# Patient Record
Sex: FEMALE | URBAN - METROPOLITAN AREA
[De-identification: names, ages, dates, MRNs, and addresses within clinical notes are randomized per-mention and may not be internally consistent; named-entity substitution may affect disease eponyms.]

---

## 2019-12-06 ENCOUNTER — EMERGENCY (EMERGENCY)
Facility: HOSPITAL | Age: 34
LOS: 1 days | Discharge: HOME | End: 2019-12-06
Attending: STUDENT IN AN ORGANIZED HEALTH CARE EDUCATION/TRAINING PROGRAM | Admitting: STUDENT IN AN ORGANIZED HEALTH CARE EDUCATION/TRAINING PROGRAM
Payer: MEDICAID

## 2019-12-06 VITALS
RESPIRATION RATE: 18 BRPM | TEMPERATURE: 98 F | OXYGEN SATURATION: 100 % | HEART RATE: 85 BPM | DIASTOLIC BLOOD PRESSURE: 71 MMHG | SYSTOLIC BLOOD PRESSURE: 115 MMHG

## 2019-12-06 LAB
ALBUMIN SERPL ELPH-MCNC: 4.2 G/DL — SIGNIFICANT CHANGE UP (ref 3.3–5)
ALP SERPL-CCNC: 62 U/L — SIGNIFICANT CHANGE UP (ref 40–120)
ALT FLD-CCNC: 35 U/L — HIGH (ref 4–33)
ANION GAP SERPL CALC-SCNC: 11 MMO/L — SIGNIFICANT CHANGE UP (ref 7–14)
APPEARANCE UR: CLEAR — SIGNIFICANT CHANGE UP
AST SERPL-CCNC: 41 U/L — HIGH (ref 4–32)
BACTERIA # UR AUTO: NEGATIVE — SIGNIFICANT CHANGE UP
BASE EXCESS BLDV CALC-SCNC: -0.5 MMOL/L — SIGNIFICANT CHANGE UP
BASOPHILS # BLD AUTO: 0.04 K/UL — SIGNIFICANT CHANGE UP (ref 0–0.2)
BASOPHILS NFR BLD AUTO: 0.7 % — SIGNIFICANT CHANGE UP (ref 0–2)
BILIRUB SERPL-MCNC: 0.3 MG/DL — SIGNIFICANT CHANGE UP (ref 0.2–1.2)
BILIRUB UR-MCNC: NEGATIVE — SIGNIFICANT CHANGE UP
BLOOD GAS VENOUS - CREATININE: 0.47 MG/DL — LOW (ref 0.5–1.3)
BLOOD UR QL VISUAL: NEGATIVE — SIGNIFICANT CHANGE UP
BUN SERPL-MCNC: 14 MG/DL — SIGNIFICANT CHANGE UP (ref 7–23)
CALCIUM SERPL-MCNC: 9.3 MG/DL — SIGNIFICANT CHANGE UP (ref 8.4–10.5)
CHLORIDE BLDV-SCNC: 111 MMOL/L — HIGH (ref 96–108)
CHLORIDE SERPL-SCNC: 107 MMOL/L — SIGNIFICANT CHANGE UP (ref 98–107)
CO2 SERPL-SCNC: 21 MMOL/L — LOW (ref 22–31)
COLOR SPEC: YELLOW — SIGNIFICANT CHANGE UP
CREAT SERPL-MCNC: 0.43 MG/DL — LOW (ref 0.5–1.3)
EOSINOPHIL # BLD AUTO: 0.18 K/UL — SIGNIFICANT CHANGE UP (ref 0–0.5)
EOSINOPHIL NFR BLD AUTO: 3.2 % — SIGNIFICANT CHANGE UP (ref 0–6)
GAS PNL BLDV: 139 MMOL/L — SIGNIFICANT CHANGE UP (ref 136–146)
GLUCOSE BLDV-MCNC: 106 MG/DL — HIGH (ref 70–99)
GLUCOSE SERPL-MCNC: 107 MG/DL — HIGH (ref 70–99)
GLUCOSE UR-MCNC: NEGATIVE — SIGNIFICANT CHANGE UP
HCO3 BLDV-SCNC: 23 MMOL/L — SIGNIFICANT CHANGE UP (ref 20–27)
HCT VFR BLD CALC: 35.8 % — SIGNIFICANT CHANGE UP (ref 34.5–45)
HCT VFR BLDV CALC: 36 % — SIGNIFICANT CHANGE UP (ref 34.5–45)
HGB BLD-MCNC: 10.8 G/DL — LOW (ref 11.5–15.5)
HGB BLDV-MCNC: 11.7 G/DL — SIGNIFICANT CHANGE UP (ref 11.5–15.5)
HYALINE CASTS # UR AUTO: NEGATIVE — SIGNIFICANT CHANGE UP
IMM GRANULOCYTES NFR BLD AUTO: 1.2 % — SIGNIFICANT CHANGE UP (ref 0–1.5)
KETONES UR-MCNC: NEGATIVE — SIGNIFICANT CHANGE UP
LACTATE BLDV-MCNC: 1.3 MMOL/L — SIGNIFICANT CHANGE UP (ref 0.5–2)
LEUKOCYTE ESTERASE UR-ACNC: NEGATIVE — SIGNIFICANT CHANGE UP
LIDOCAIN IGE QN: 30 U/L — SIGNIFICANT CHANGE UP (ref 7–60)
LYMPHOCYTES # BLD AUTO: 1.45 K/UL — SIGNIFICANT CHANGE UP (ref 1–3.3)
LYMPHOCYTES # BLD AUTO: 25.6 % — SIGNIFICANT CHANGE UP (ref 13–44)
MAGNESIUM SERPL-MCNC: 2.1 MG/DL — SIGNIFICANT CHANGE UP (ref 1.6–2.6)
MCHC RBC-ENTMCNC: 27.3 PG — SIGNIFICANT CHANGE UP (ref 27–34)
MCHC RBC-ENTMCNC: 30.2 % — LOW (ref 32–36)
MCV RBC AUTO: 90.6 FL — SIGNIFICANT CHANGE UP (ref 80–100)
MONOCYTES # BLD AUTO: 0.44 K/UL — SIGNIFICANT CHANGE UP (ref 0–0.9)
MONOCYTES NFR BLD AUTO: 7.8 % — SIGNIFICANT CHANGE UP (ref 2–14)
NEUTROPHILS # BLD AUTO: 3.49 K/UL — SIGNIFICANT CHANGE UP (ref 1.8–7.4)
NEUTROPHILS NFR BLD AUTO: 61.5 % — SIGNIFICANT CHANGE UP (ref 43–77)
NITRITE UR-MCNC: NEGATIVE — SIGNIFICANT CHANGE UP
NRBC # FLD: 0.02 K/UL — SIGNIFICANT CHANGE UP (ref 0–0)
PCO2 BLDV: 49 MMHG — SIGNIFICANT CHANGE UP (ref 41–51)
PH BLDV: 7.33 PH — SIGNIFICANT CHANGE UP (ref 7.32–7.43)
PH UR: 6 — SIGNIFICANT CHANGE UP (ref 5–8)
PHOSPHATE SERPL-MCNC: 3.4 MG/DL — SIGNIFICANT CHANGE UP (ref 2.5–4.5)
PLATELET # BLD AUTO: 300 K/UL — SIGNIFICANT CHANGE UP (ref 150–400)
PMV BLD: 11.3 FL — SIGNIFICANT CHANGE UP (ref 7–13)
PO2 BLDV: 55 MMHG — HIGH (ref 35–40)
POTASSIUM BLDV-SCNC: 3.7 MMOL/L — SIGNIFICANT CHANGE UP (ref 3.4–4.5)
POTASSIUM SERPL-MCNC: 4.4 MMOL/L — SIGNIFICANT CHANGE UP (ref 3.5–5.3)
POTASSIUM SERPL-SCNC: 4.4 MMOL/L — SIGNIFICANT CHANGE UP (ref 3.5–5.3)
PROT SERPL-MCNC: 7.7 G/DL — SIGNIFICANT CHANGE UP (ref 6–8.3)
PROT UR-MCNC: 30 — SIGNIFICANT CHANGE UP
RBC # BLD: 3.95 M/UL — SIGNIFICANT CHANGE UP (ref 3.8–5.2)
RBC # FLD: 13.2 % — SIGNIFICANT CHANGE UP (ref 10.3–14.5)
RBC CASTS # UR COMP ASSIST: SIGNIFICANT CHANGE UP (ref 0–?)
SAO2 % BLDV: 86.4 % — HIGH (ref 60–85)
SODIUM SERPL-SCNC: 139 MMOL/L — SIGNIFICANT CHANGE UP (ref 135–145)
SP GR SPEC: 1.04 — SIGNIFICANT CHANGE UP (ref 1–1.04)
SQUAMOUS # UR AUTO: SIGNIFICANT CHANGE UP
UROBILINOGEN FLD QL: NORMAL — SIGNIFICANT CHANGE UP
WBC # BLD: 5.67 K/UL — SIGNIFICANT CHANGE UP (ref 3.8–10.5)
WBC # FLD AUTO: 5.67 K/UL — SIGNIFICANT CHANGE UP (ref 3.8–10.5)
WBC UR QL: SIGNIFICANT CHANGE UP (ref 0–?)

## 2019-12-06 PROCEDURE — 76705 ECHO EXAM OF ABDOMEN: CPT | Mod: 26

## 2019-12-06 PROCEDURE — 99284 EMERGENCY DEPT VISIT MOD MDM: CPT

## 2019-12-06 RX ORDER — SODIUM CHLORIDE 9 MG/ML
1000 INJECTION INTRAMUSCULAR; INTRAVENOUS; SUBCUTANEOUS ONCE
Refills: 0 | Status: COMPLETED | OUTPATIENT
Start: 2019-12-06 | End: 2019-12-06

## 2019-12-06 RX ORDER — FAMOTIDINE 10 MG/ML
20 INJECTION INTRAVENOUS ONCE
Refills: 0 | Status: COMPLETED | OUTPATIENT
Start: 2019-12-06 | End: 2019-12-06

## 2019-12-06 RX ADMIN — FAMOTIDINE 20 MILLIGRAM(S): 10 INJECTION INTRAVENOUS at 16:51

## 2019-12-06 RX ADMIN — Medication 30 MILLILITER(S): at 16:51

## 2019-12-06 RX ADMIN — SODIUM CHLORIDE 1000 MILLILITER(S): 9 INJECTION INTRAMUSCULAR; INTRAVENOUS; SUBCUTANEOUS at 16:51

## 2019-12-06 NOTE — ED ADULT TRIAGE NOTE - CHIEF COMPLAINT QUOTE
PT C/O headache, Abdominal pain, nausea x 2 weeks. PHX: Gallstones. Denies urinary symptoms, fevers, vomiting, CP, diarrhea, daily medications.

## 2019-12-06 NOTE — ED PROVIDER NOTE - PHYSICAL EXAMINATION
Lisa Short M.D.:   patient awake alert NAD .   LUNGS CTAB no wheeze no crackle.   CARD RRR no m/r/g.    Abdomen soft minimal epigastric and ruq tenderness ND no rebound no guarding no CVA tenderness.   EXT WWP no edema no calf tenderness CV 2+DP/PT bilaterally.   neuro A&Ox3 gait normal.    skin warm and dry no rash  HEENT: moist mucous membranes, PERRL, EOMI

## 2019-12-06 NOTE — ED PROVIDER NOTE - CLINICAL SUMMARY MEDICAL DECISION MAKING FREE TEXT BOX
34F hx gallstones p/w 1 month upper abd pain, nontoxic appearing on exam with minimal RUQ/epigastric pain. suspect gastritis given chronicity of symptoms, less likely biliary colic vs cholecystitis. plan for labs, gi cocktail, ruq us and reassess.

## 2019-12-06 NOTE — ED PROVIDER NOTE - PATIENT PORTAL LINK FT
You can access the FollowMyHealth Patient Portal offered by [facility] by registering at the following website: [email or web address]. By joining [employer]’s FollowMyHealth portal, you will also be able to view your health information using other applications (apps) compatible with our system.

## 2019-12-06 NOTE — ED PROVIDER NOTE - OBJECTIVE STATEMENT
Lisa Short M.D: 34F hx gallstones p/w 1 month of abd pain, diffuse, worse in upper abdomen assoc w/ nausea but no vomiting no f/c no diarrhea. has been taking tylenol without relief. notes pain ahs been worsening over the last week prompting ed visit. also with unrelated msk back pain and some mild frontal ehadache and lightheadedness.

## 2019-12-06 NOTE — ED PROVIDER NOTE - FAMILY HISTORY
How Severe Is Your Rash?: moderate
Is This A New Presentation, Or A Follow-Up?: Rash
No pertinent family history in first degree relatives

## 2019-12-08 LAB
BACTERIA UR CULT: SIGNIFICANT CHANGE UP
SPECIMEN SOURCE: SIGNIFICANT CHANGE UP

## 2020-03-14 ENCOUNTER — EMERGENCY (EMERGENCY)
Facility: HOSPITAL | Age: 35
LOS: 1 days | Discharge: HOME | End: 2020-03-14
Admitting: EMERGENCY MEDICINE
Payer: MEDICAID

## 2020-03-14 VITALS
DIASTOLIC BLOOD PRESSURE: 66 MMHG | SYSTOLIC BLOOD PRESSURE: 105 MMHG | RESPIRATION RATE: 14 BRPM | HEART RATE: 65 BPM | TEMPERATURE: 98 F | OXYGEN SATURATION: 100 %

## 2020-03-14 VITALS
SYSTOLIC BLOOD PRESSURE: 111 MMHG | OXYGEN SATURATION: 99 % | RESPIRATION RATE: 16 BRPM | TEMPERATURE: 99 F | DIASTOLIC BLOOD PRESSURE: 60 MMHG | HEART RATE: 62 BPM

## 2020-03-14 DIAGNOSIS — O03.9 COMPLETE OR UNSPECIFIED SPONTANEOUS ABORTION WITHOUT COMPLICATION: ICD-10-CM

## 2020-03-14 LAB
ALBUMIN SERPL ELPH-MCNC: 4.2 G/DL — SIGNIFICANT CHANGE UP (ref 3.3–5)
ALP SERPL-CCNC: 49 U/L — SIGNIFICANT CHANGE UP (ref 40–120)
ALT FLD-CCNC: 12 U/L — SIGNIFICANT CHANGE UP (ref 4–33)
ANION GAP SERPL CALC-SCNC: 14 MMO/L — SIGNIFICANT CHANGE UP (ref 7–14)
APPEARANCE UR: SIGNIFICANT CHANGE UP
AST SERPL-CCNC: 18 U/L — SIGNIFICANT CHANGE UP (ref 4–32)
BACTERIA # UR AUTO: NEGATIVE — SIGNIFICANT CHANGE UP
BASOPHILS # BLD AUTO: 0.03 K/UL — SIGNIFICANT CHANGE UP (ref 0–0.2)
BASOPHILS NFR BLD AUTO: 0.4 % — SIGNIFICANT CHANGE UP (ref 0–2)
BILIRUB SERPL-MCNC: 0.4 MG/DL — SIGNIFICANT CHANGE UP (ref 0.2–1.2)
BILIRUB UR-MCNC: NEGATIVE — SIGNIFICANT CHANGE UP
BLD GP AB SCN SERPL QL: NEGATIVE — SIGNIFICANT CHANGE UP
BLOOD UR QL VISUAL: HIGH
BUN SERPL-MCNC: 5 MG/DL — LOW (ref 7–23)
CALCIUM SERPL-MCNC: 9.6 MG/DL — SIGNIFICANT CHANGE UP (ref 8.4–10.5)
CHLORIDE SERPL-SCNC: 106 MMOL/L — SIGNIFICANT CHANGE UP (ref 98–107)
CO2 SERPL-SCNC: 19 MMOL/L — LOW (ref 22–31)
COLOR SPEC: SIGNIFICANT CHANGE UP
CREAT SERPL-MCNC: 0.44 MG/DL — LOW (ref 0.5–1.3)
EOSINOPHIL # BLD AUTO: 0.1 K/UL — SIGNIFICANT CHANGE UP (ref 0–0.5)
EOSINOPHIL NFR BLD AUTO: 1.4 % — SIGNIFICANT CHANGE UP (ref 0–6)
GLUCOSE SERPL-MCNC: 90 MG/DL — SIGNIFICANT CHANGE UP (ref 70–99)
GLUCOSE UR-MCNC: NEGATIVE — SIGNIFICANT CHANGE UP
HCG SERPL-ACNC: 1316 MIU/ML — SIGNIFICANT CHANGE UP
HCT VFR BLD CALC: 37.6 % — SIGNIFICANT CHANGE UP (ref 34.5–45)
HGB BLD-MCNC: 11.3 G/DL — LOW (ref 11.5–15.5)
HYALINE CASTS # UR AUTO: NEGATIVE — SIGNIFICANT CHANGE UP
IMM GRANULOCYTES NFR BLD AUTO: 0.7 % — SIGNIFICANT CHANGE UP (ref 0–1.5)
KETONES UR-MCNC: NEGATIVE — SIGNIFICANT CHANGE UP
LEUKOCYTE ESTERASE UR-ACNC: SIGNIFICANT CHANGE UP
LYMPHOCYTES # BLD AUTO: 1.89 K/UL — SIGNIFICANT CHANGE UP (ref 1–3.3)
LYMPHOCYTES # BLD AUTO: 27.4 % — SIGNIFICANT CHANGE UP (ref 13–44)
MCHC RBC-ENTMCNC: 26 PG — LOW (ref 27–34)
MCHC RBC-ENTMCNC: 30.1 % — LOW (ref 32–36)
MCV RBC AUTO: 86.4 FL — SIGNIFICANT CHANGE UP (ref 80–100)
MONOCYTES # BLD AUTO: 0.41 K/UL — SIGNIFICANT CHANGE UP (ref 0–0.9)
MONOCYTES NFR BLD AUTO: 5.9 % — SIGNIFICANT CHANGE UP (ref 2–14)
NEUTROPHILS # BLD AUTO: 4.43 K/UL — SIGNIFICANT CHANGE UP (ref 1.8–7.4)
NEUTROPHILS NFR BLD AUTO: 64.2 % — SIGNIFICANT CHANGE UP (ref 43–77)
NITRITE UR-MCNC: NEGATIVE — SIGNIFICANT CHANGE UP
NRBC # FLD: 0.02 K/UL — SIGNIFICANT CHANGE UP (ref 0–0)
PH UR: 6 — SIGNIFICANT CHANGE UP (ref 5–8)
PLATELET # BLD AUTO: 376 K/UL — SIGNIFICANT CHANGE UP (ref 150–400)
PMV BLD: 10.5 FL — SIGNIFICANT CHANGE UP (ref 7–13)
POTASSIUM SERPL-MCNC: 4 MMOL/L — SIGNIFICANT CHANGE UP (ref 3.5–5.3)
POTASSIUM SERPL-SCNC: 4 MMOL/L — SIGNIFICANT CHANGE UP (ref 3.5–5.3)
PROT SERPL-MCNC: 7.7 G/DL — SIGNIFICANT CHANGE UP (ref 6–8.3)
PROT UR-MCNC: 30 — SIGNIFICANT CHANGE UP
RBC # BLD: 4.35 M/UL — SIGNIFICANT CHANGE UP (ref 3.8–5.2)
RBC # FLD: 13.4 % — SIGNIFICANT CHANGE UP (ref 10.3–14.5)
RBC CASTS # UR COMP ASSIST: >50 — HIGH (ref 0–?)
RH IG SCN BLD-IMP: POSITIVE — SIGNIFICANT CHANGE UP
SODIUM SERPL-SCNC: 139 MMOL/L — SIGNIFICANT CHANGE UP (ref 135–145)
SP GR SPEC: 1.01 — SIGNIFICANT CHANGE UP (ref 1–1.04)
SQUAMOUS # UR AUTO: SIGNIFICANT CHANGE UP
UROBILINOGEN FLD QL: NORMAL — SIGNIFICANT CHANGE UP
WBC # BLD: 6.91 K/UL — SIGNIFICANT CHANGE UP (ref 3.8–10.5)
WBC # FLD AUTO: 6.91 K/UL — SIGNIFICANT CHANGE UP (ref 3.8–10.5)
WBC UR QL: SIGNIFICANT CHANGE UP (ref 0–?)

## 2020-03-14 PROCEDURE — 99284 EMERGENCY DEPT VISIT MOD MDM: CPT

## 2020-03-14 PROCEDURE — 76830 TRANSVAGINAL US NON-OB: CPT | Mod: 26

## 2020-03-14 RX ORDER — ACETAMINOPHEN 500 MG
975 TABLET ORAL ONCE
Refills: 0 | Status: COMPLETED | OUTPATIENT
Start: 2020-03-14 | End: 2020-03-14

## 2020-03-14 RX ADMIN — Medication 975 MILLIGRAM(S): at 19:27

## 2020-03-14 NOTE — ED PROVIDER NOTE - CLINICAL SUMMARY MEDICAL DECISION MAKING FREE TEXT BOX
33 yo female c/o vaginal bleeding and cramping lower abdominal pain x 2 days  -possible threatened ab  -labs, tvus  -possible obgyn consult

## 2020-03-14 NOTE — CONSULT NOTE ADULT - ATTENDING COMMENTS
35 y/o  LMP 18 at 8w0d by LMP c/o vaginal bleeding. No active bleeding on exam, Blood Type O+ BhCG 1316. TVUS done, no IUP seen, pregnancy of unknown location, likely complete , to f/u in 48 hours.

## 2020-03-14 NOTE — ED ADULT NURSE REASSESSMENT NOTE - NS ED NURSE REASSESS COMMENT FT1
Pt received from ultra sound alert and oriented x 4 pt is currently complaining of pelvic pain with vaginal bleeding 10/10 pain PA King was made aware v/s taken pt is afebrile.  Patient was medicated as ordered for pain.

## 2020-03-14 NOTE — CONSULT NOTE ADULT - ASSESSMENT
33 y/o  LMP  at 8w0d by LMP presents to the ED with vaginal bleeding, decreased since passing a large clot after sono. No active bleeding on exam, small clot in vault. Vital sign stable, H/H 11.3/37.6 T&S O+ bhcg 1316. TVUS shows endometrium of 1.4cm, no IUP seen, adnexa WNL. Pt likely with complete spontaneous .

## 2020-03-14 NOTE — CONSULT NOTE ADULT - SUBJECTIVE AND OBJECTIVE BOX
RENETTA ACE  34y  Female [id number]    HPI: 35 y/o  LMP  at 8w0d by LMP presents to the ED with vaginal bleeding. Pt reports positive home pregnancy test in early March, has not seen a GYN for this pregnancy. Pt reports she began experiencing spotting around midnight last night, bleeding progressively worsened. Pt reported to ED when she was changing pad Q2H with clot passage. Pt reports she passed a large clot after ultrasound, bleeding has slowed since. Pt reports mild abdominal cramping, relieved with Tylenol. Denies lightheadedness/dizziness. Denies f/c/n/v.         Name of GYN Physician: [facility]    POB:  , pLTCS 2015 NRFHT, SAB x1    Pgyn: Denies fibroids, cysts, endometriosis, STI's, Abnormal pap smears     Home meds: none    Hospital Meds:   MEDICATIONS  (STANDING):    MEDICATIONS  (PRN):      Allergies    proton pump inhibitors (Rash; Urticaria; Pruritus; Hives)  sulfa topicals (Rash; Urticaria; Pruritus; Hives)    Intolerances        PAST MEDICAL & SURGICAL HISTORY:  No pertinent past medical history  No significant past surgical history      FAMILY HISTORY:  No pertinent family history in first degree relatives      Social History:  Denies smoking use, drug use, alcohol use.     Vital Signs Last 24 Hrs  T(C): 36.8 (14 Mar 2020 19:19), Max: 37 (14 Mar 2020 15:16)  T(F): 98.3 (14 Mar 2020 19:19), Max: 98.6 (14 Mar 2020 15:16)  HR: 65 (14 Mar 2020 19:19) (62 - 65)  BP: 105/66 (14 Mar 2020 19:19) (105/66 - 111/60)  BP(mean): --  RR: 14 (14 Mar 2020 19:19) (14 - 16)  SpO2: 100% (14 Mar 2020 19:19) (99% - 100%)    Physical Exam:   General: sitting comfortably in bed, NAD   HEENT: neck supple, full ROM  CV: RR S1S2 no m/r/g  Lungs: CTA b/l, good air flow b/l   Back: No CVA tenderness  Abd: Soft, non-tender, non-distended.  Bowel sounds present.    :   Blood on 1/2 pad, changed 1 hour ago.    External labia wnl.  Bimanual exam with no cervical motion tenderness, uterus wnl, adnexa non palpable b/l. Cervix dilated 0.5 cm   Speculum Exam: No active bleeding from os, small clot evacuated from vault  Ext: non-tender b/l, no edema     LABS:                              11.3   6.91  )-----------( 376      ( 14 Mar 2020 18:30 )             37.6     03-14    139  |  106  |  5<L>  ----------------------------<  90  4.0   |  19<L>  |  0.44<L>    Ca    9.6      14 Mar 2020 18:30    TPro  7.7  /  Alb  4.2  /  TBili  0.4  /  DBili  x   /  AST  18  /  ALT  12  /  AlkPhos  49  03-14    HCG Quantitative, Serum: 1316: For pregnancy evaluation the reference values are as  follows:    Negative: <5 mIU/mL  Indeterminate: 5-25 mIU/mL (suggest repeat testing in 72hrs)  Positive: >25 mIU/mL    Note: hCG results of false positive and false negative for  pregnancy are rare, but can occur with this, and other hCG  tests. Annette- and post-menopausal females may have mildly  elevated hCG concentrations, usually less than 14 mIU/mL,  that are constant over time.    Weeks of pregnancy:           mIU/mL  ------------------        -------  3                     6 -      71  4                    10 -     750  5                   220 -   7,100  6                   160 -  32,000  7                 3,700 - 164,000  8                32,000 - 150,000  9                64,000- 151,000  10                47,000 - 187,000  12                28,000 - 211,000  14                14,000 -  63,000  15                12,000 -  71,000  16 - 18            8,000 -  58,000 mIU/mL (20 @ 18:30)        I&O's Detail      Urinalysis Basic - ( 14 Mar 2020 17:52 )    Color: RED / Appearance: Lt TURBID / S.012 / pH: 6.0  Gluc: NEGATIVE / Ketone: NEGATIVE  / Bili: NEGATIVE / Urobili: NORMAL   Blood: LARGE / Protein: 30 / Nitrite: NEGATIVE   Leuk Esterase: TRACE / RBC: >50 / WBC 0-2   Sq Epi: OCC / Non Sq Epi: x / Bacteria: NEGATIVE        RADIOLOGY & ADDITIONAL STUDIES:

## 2020-03-14 NOTE — ED PROVIDER NOTE - NSFOLLOWUPINSTRUCTIONS_ED_ALL_ED_FT
Follow up with your primary doctor and OBGYN on Monday at the OBGYN Clinic. The phone number is . If you cannot be seen in the clinic return to the ER. Return to the ER for lightheadedness, weakness, vaginal bleeding or for any other symptoms that concern you. Advance activity as tolerated.  Continue all previously prescribed medications as directed.  Follow up with your primary care physician in 48-72 hours- bring copies of your results.  Return to the ER for worsening or persistent symptoms, and/or ANY NEW OR CONCERNING SYMPTOMS. THIS INCLUDES BUT IS NOT LIMITED TO LIGHTHEADEDNESS, BLEEDING WITH CLOTS THE SIZE OF A FIST, BLEEDING THROUGH A LARGE PAD EVERY HOUR FOR MORE THAN 2 HOURS OR FOR ANY OTHER SYMPTOMS OR COMPLAINTS. If you have issues obtaining follow up, please call: [phone] to obtain a doctor or specialist who takes your insurance in your area.  You may call [phone] to make an appointment with the internal medicine clinic.

## 2020-03-14 NOTE — ED ADULT TRIAGE NOTE - CHIEF COMPLAINT QUOTE
Pt c/o abd pain and vaginal bleeding since last night.  7 weeks pregnant.  Was seen at [facility] last night and was told to return to ER if vaginal bleeding occured

## 2020-03-14 NOTE — CONSULT NOTE ADULT - PROBLEM SELECTOR RECOMMENDATION 9
-Recommend f/u in GYN clinic in 48 hours (Monday 3/16) for repeat bhcg. [phone]  -Tylenol 975mg PO Q6H and Motrin 600mg Q6H PRN for pain. Encouraged pt to take pain medication around the clock for the next few days.   -Bleeding precautions reviewed. Pt advised to return to the ED is she is soaking through 1 large pad/hour for > 2 hours, clot passage larger than fist-sized, experiences lightheadedness, dizziness, vomiting, inability to tolerate PO.       D/w Dr. Huong tavarez pgy-2

## 2020-03-14 NOTE — ED PROVIDER NOTE - OBJECTIVE STATEMENT
33 y/o female  (1 full term pregnancy, 1 miscarriage at  8-10 weeks) ~ 7 weeks pregnant presents to ER c/o vaginal bleeding and lower abdominal pain. Pt. states last night started to have some vaginal spotting - went to [facility] ER - was told she had a uti and dc on abx (unclear bout blood work or US there) and was told to follow up with obgyn ([facility]) - states today, a few hours ago the bleeding became worse and having some lower abdominal pain also. Dneies fveer chills nausea vomit weakness dizziness.

## 2020-03-14 NOTE — ED PROVIDER NOTE - PATIENT PORTAL LINK FT
You can access the FollowMyHealth Patient Portal offered by [facility] by registering at the following website: [email or web address]. By joining [employer]’s FollowMyHealth portal, you will also be able to view your health information using other applications (apps) compatible with our system.

## 2020-03-16 ENCOUNTER — LABORATORY RESULT (OUTPATIENT)
Age: 35
End: 2020-03-16

## 2020-03-16 ENCOUNTER — APPOINTMENT (OUTPATIENT)
Dept: OBGYN | Facility: HOSPITAL | Age: 35
End: 2020-03-16
Payer: MEDICAID

## 2020-03-16 ENCOUNTER — OUTPATIENT (OUTPATIENT)
Dept: OUTPATIENT SERVICES | Facility: HOSPITAL | Age: 35
LOS: 1 days | End: 2020-03-16

## 2020-03-16 VITALS
WEIGHT: 183 LBS | HEIGHT: 65 IN | DIASTOLIC BLOOD PRESSURE: 75 MMHG | HEART RATE: 62 BPM | SYSTOLIC BLOOD PRESSURE: 113 MMHG | BODY MASS INDEX: 30.49 KG/M2

## 2020-03-16 DIAGNOSIS — Z00.00 ENCOUNTER FOR GENERAL ADULT MEDICAL EXAMINATION W/OUT ABNORMAL FINDINGS: ICD-10-CM

## 2020-03-16 LAB — HCG SERPL-ACNC: 910.8 MIU/ML — SIGNIFICANT CHANGE UP

## 2020-03-16 PROCEDURE — 99213 OFFICE O/P EST LOW 20 MIN: CPT | Mod: TH,GE

## 2020-03-16 NOTE — COUNSELING
[Lab Results] : lab results [Pregnancy Options] : pregnancy options [Medication Management] : medication management

## 2020-03-17 DIAGNOSIS — O03.9 COMPLETE OR UNSPECIFIED SPONTANEOUS ABORTION WITHOUT COMPLICATION: ICD-10-CM

## 2020-03-23 ENCOUNTER — APPOINTMENT (OUTPATIENT)
Dept: OBGYN | Facility: HOSPITAL | Age: 35
End: 2020-03-23

## 2020-04-01 ENCOUNTER — APPOINTMENT (OUTPATIENT)
Dept: OBGYN | Facility: HOSPITAL | Age: 35
End: 2020-04-01

## 2020-04-16 NOTE — PHYSICAL EXAM
[Awake] : awake [Alert] : alert [Soft] : soft [Oriented x3] : oriented to person, place, and time [No Lesions] : no genitalia lesions [Labia Majora] : labia major [Labia Minora] : labia minora [Normal] : clitoris [Pink Rugae] : pink rugae [Discharge] : had a ~M discharge [Moderate] : moderate [Bloody] : bloody [Uterine Adnexae] : were not tender and not enlarged [RRR, No Murmurs] : RRR, no murmurs [CTAB] : CTAB [Acute Distress] : no acute distress [Tender] : non tender [Distended] : not distended [Depressed Mood] : not depressed [Labia Majora Erythema] : no erythema of the labia majora [Labia Minora Erythema] : no erythema of the labia minora [Dilated] : the cervix was not dilated [FreeTextEntry4] : 2cm clot in vaginal vault

## 2020-09-18 ENCOUNTER — EMERGENCY (EMERGENCY)
Facility: HOSPITAL | Age: 35
LOS: 1 days | Discharge: HOME | End: 2020-09-18
Attending: STUDENT IN AN ORGANIZED HEALTH CARE EDUCATION/TRAINING PROGRAM | Admitting: STUDENT IN AN ORGANIZED HEALTH CARE EDUCATION/TRAINING PROGRAM
Payer: MEDICAID

## 2020-09-18 VITALS
TEMPERATURE: 101 F | DIASTOLIC BLOOD PRESSURE: 86 MMHG | OXYGEN SATURATION: 100 % | HEART RATE: 121 BPM | SYSTOLIC BLOOD PRESSURE: 136 MMHG | RESPIRATION RATE: 20 BRPM

## 2020-09-18 LAB
ALBUMIN SERPL ELPH-MCNC: 4.7 G/DL — SIGNIFICANT CHANGE UP (ref 3.3–5)
ALP SERPL-CCNC: 68 U/L — SIGNIFICANT CHANGE UP (ref 40–120)
ALT FLD-CCNC: 17 U/L — SIGNIFICANT CHANGE UP (ref 4–33)
ANION GAP SERPL CALC-SCNC: 16 MMO/L — HIGH (ref 7–14)
AST SERPL-CCNC: 12 U/L — SIGNIFICANT CHANGE UP (ref 4–32)
BASE EXCESS BLDV CALC-SCNC: 1.4 MMOL/L — SIGNIFICANT CHANGE UP
BASOPHILS # BLD AUTO: 0.03 K/UL — SIGNIFICANT CHANGE UP (ref 0–0.2)
BASOPHILS NFR BLD AUTO: 0.3 % — SIGNIFICANT CHANGE UP (ref 0–2)
BILIRUB SERPL-MCNC: 0.4 MG/DL — SIGNIFICANT CHANGE UP (ref 0.2–1.2)
BLOOD GAS VENOUS - CREATININE: 0.67 MG/DL — SIGNIFICANT CHANGE UP (ref 0.5–1.3)
BUN SERPL-MCNC: 9 MG/DL — SIGNIFICANT CHANGE UP (ref 7–23)
CALCIUM SERPL-MCNC: 9.9 MG/DL — SIGNIFICANT CHANGE UP (ref 8.4–10.5)
CHLORIDE BLDV-SCNC: 110 MMOL/L — HIGH (ref 96–108)
CHLORIDE SERPL-SCNC: 98 MMOL/L — SIGNIFICANT CHANGE UP (ref 98–107)
CO2 SERPL-SCNC: 21 MMOL/L — LOW (ref 22–31)
CREAT SERPL-MCNC: 0.58 MG/DL — SIGNIFICANT CHANGE UP (ref 0.5–1.3)
EOSINOPHIL # BLD AUTO: 0.02 K/UL — SIGNIFICANT CHANGE UP (ref 0–0.5)
EOSINOPHIL NFR BLD AUTO: 0.2 % — SIGNIFICANT CHANGE UP (ref 0–6)
GAS PNL BLDV: 136 MMOL/L — SIGNIFICANT CHANGE UP (ref 136–146)
GLUCOSE BLDV-MCNC: 97 MG/DL — SIGNIFICANT CHANGE UP (ref 70–99)
GLUCOSE SERPL-MCNC: 101 MG/DL — HIGH (ref 70–99)
HCO3 BLDV-SCNC: 24 MMOL/L — SIGNIFICANT CHANGE UP (ref 20–27)
HCT VFR BLD CALC: 39.8 % — SIGNIFICANT CHANGE UP (ref 34.5–45)
HCT VFR BLDV CALC: 42.2 % — SIGNIFICANT CHANGE UP (ref 34.5–45)
HGB BLD-MCNC: 11.6 G/DL — SIGNIFICANT CHANGE UP (ref 11.5–15.5)
HGB BLDV-MCNC: 13.7 G/DL — SIGNIFICANT CHANGE UP (ref 11.5–15.5)
HIV COMBO RESULT: SIGNIFICANT CHANGE UP
HIV1+2 AB SPEC QL: SIGNIFICANT CHANGE UP
IMM GRANULOCYTES NFR BLD AUTO: 0.5 % — SIGNIFICANT CHANGE UP (ref 0–1.5)
LACTATE BLDV-MCNC: 1.3 MMOL/L — SIGNIFICANT CHANGE UP (ref 0.5–2)
LIDOCAIN IGE QN: 18.5 U/L — SIGNIFICANT CHANGE UP (ref 7–60)
LYMPHOCYTES # BLD AUTO: 1.28 K/UL — SIGNIFICANT CHANGE UP (ref 1–3.3)
LYMPHOCYTES # BLD AUTO: 11.7 % — LOW (ref 13–44)
MCHC RBC-ENTMCNC: 24.7 PG — LOW (ref 27–34)
MCHC RBC-ENTMCNC: 29.1 % — LOW (ref 32–36)
MCV RBC AUTO: 84.7 FL — SIGNIFICANT CHANGE UP (ref 80–100)
MONOCYTES # BLD AUTO: 0.69 K/UL — SIGNIFICANT CHANGE UP (ref 0–0.9)
MONOCYTES NFR BLD AUTO: 6.3 % — SIGNIFICANT CHANGE UP (ref 2–14)
NEUTROPHILS # BLD AUTO: 8.83 K/UL — HIGH (ref 1.8–7.4)
NEUTROPHILS NFR BLD AUTO: 81 % — HIGH (ref 43–77)
NRBC # FLD: 0 K/UL — SIGNIFICANT CHANGE UP (ref 0–0)
PCO2 BLDV: 44 MMHG — SIGNIFICANT CHANGE UP (ref 41–51)
PH BLDV: 7.39 PH — SIGNIFICANT CHANGE UP (ref 7.32–7.43)
PLATELET # BLD AUTO: 404 K/UL — HIGH (ref 150–400)
PMV BLD: 10.1 FL — SIGNIFICANT CHANGE UP (ref 7–13)
PO2 BLDV: 30 MMHG — LOW (ref 35–40)
POTASSIUM BLDV-SCNC: 3.4 MMOL/L — SIGNIFICANT CHANGE UP (ref 3.4–4.5)
POTASSIUM SERPL-MCNC: 3.4 MMOL/L — LOW (ref 3.5–5.3)
POTASSIUM SERPL-SCNC: 3.4 MMOL/L — LOW (ref 3.5–5.3)
PROT SERPL-MCNC: 8.4 G/DL — HIGH (ref 6–8.3)
RBC # BLD: 4.7 M/UL — SIGNIFICANT CHANGE UP (ref 3.8–5.2)
RBC # FLD: 14.4 % — SIGNIFICANT CHANGE UP (ref 10.3–14.5)
SAO2 % BLDV: 50.8 % — LOW (ref 60–85)
SODIUM SERPL-SCNC: 135 MMOL/L — SIGNIFICANT CHANGE UP (ref 135–145)
WBC # BLD: 10.91 K/UL — HIGH (ref 3.8–10.5)
WBC # FLD AUTO: 10.91 K/UL — HIGH (ref 3.8–10.5)

## 2020-09-18 PROCEDURE — 76700 US EXAM ABDOM COMPLETE: CPT | Mod: 26

## 2020-09-18 PROCEDURE — 99285 EMERGENCY DEPT VISIT HI MDM: CPT

## 2020-09-18 PROCEDURE — 71045 X-RAY EXAM CHEST 1 VIEW: CPT | Mod: 26

## 2020-09-18 RX ORDER — MORPHINE SULFATE 50 MG/1
4 CAPSULE, EXTENDED RELEASE ORAL ONCE
Refills: 0 | Status: DISCONTINUED | OUTPATIENT
Start: 2020-09-18 | End: 2020-09-18

## 2020-09-18 RX ORDER — METOCLOPRAMIDE HCL 10 MG
10 TABLET ORAL ONCE
Refills: 0 | Status: COMPLETED | OUTPATIENT
Start: 2020-09-18 | End: 2020-09-18

## 2020-09-18 RX ORDER — PIPERACILLIN AND TAZOBACTAM 4; .5 G/20ML; G/20ML
3.38 INJECTION, POWDER, LYOPHILIZED, FOR SOLUTION INTRAVENOUS ONCE
Refills: 0 | Status: COMPLETED | OUTPATIENT
Start: 2020-09-18 | End: 2020-09-18

## 2020-09-18 RX ORDER — SODIUM CHLORIDE 9 MG/ML
2000 INJECTION INTRAMUSCULAR; INTRAVENOUS; SUBCUTANEOUS ONCE
Refills: 0 | Status: COMPLETED | OUTPATIENT
Start: 2020-09-18 | End: 2020-09-18

## 2020-09-18 RX ORDER — ACETAMINOPHEN 500 MG
975 TABLET ORAL ONCE
Refills: 0 | Status: COMPLETED | OUTPATIENT
Start: 2020-09-18 | End: 2020-09-18

## 2020-09-18 RX ADMIN — PIPERACILLIN AND TAZOBACTAM 200 GRAM(S): 4; .5 INJECTION, POWDER, LYOPHILIZED, FOR SOLUTION INTRAVENOUS at 21:49

## 2020-09-18 RX ADMIN — MORPHINE SULFATE 4 MILLIGRAM(S): 50 CAPSULE, EXTENDED RELEASE ORAL at 21:49

## 2020-09-18 RX ADMIN — SODIUM CHLORIDE 2000 MILLILITER(S): 9 INJECTION INTRAMUSCULAR; INTRAVENOUS; SUBCUTANEOUS at 21:41

## 2020-09-18 RX ADMIN — Medication 10 MILLIGRAM(S): at 21:49

## 2020-09-18 RX ADMIN — Medication 975 MILLIGRAM(S): at 22:04

## 2020-09-18 NOTE — ED ADULT TRIAGE NOTE - CHIEF COMPLAINT QUOTE
pt sent in from urgent for RLQ ,epigastric pain, headache and nausea starting yesterday. pt is guarded and appears uncomfortable .EKG in progress. denies PMH

## 2020-09-18 NOTE — ED ADULT NURSE NOTE - OBJECTIVE STATEMENT
Pt received to room 12 a/o x 3 c/o sharp epigastric pain, nausea,  and headache x 2 days. Pt was seen at urgent care and sent to ED for further eval. Pt noted to be tachy and febrile. Pt states pain radiates up to her chest. Respirations even and unlabored. Lung sounds clear with equal chest rise bilaterally. ABD is soft, tender, non distended with normal active bowel sounds. 20g  iv placed ro left ac. labs drawn and sent medication given as per order.

## 2020-09-18 NOTE — ED PROVIDER NOTE - ATTENDING CONTRIBUTION TO CARE
Rich BUTLER: I agree with the above provided history and exam and addend/modify it as follows.    34yoF no PMH presents with mid parietal headache with left neck pain intermittently x 1 month with no neck stiffness that has worsened over the month and now has diffuse abdominal pain and mid chest pain with associated nausea and nonbloody emesis, dysuria, and fever x 2 days. No diarrhea or blood in stool, no vaginal discharge. No rash. No trauma. No SOB, cough, sore throat, runny nose. She took tylenol in the morning with minimal relief and came to ED for evaluation. LMP 9/6/20    On exam patient is diffusely tender over abdomen, including epigastric/upper/lower abdomen. Also tachycardiac, appears uncomfortable.    Likely infectious etiology vs intra-abd process (appy, cholecystitis, diverticulitis), although has very nonspecific exam. Will check US, CTAP, UA, labs, give IVF/sx relief, reassess    I Jayesh Villanueva MD performed a history and physical exam of the patient and discussed their management with the resident and /or advanced care provider. I reviewed the resident and /or ACP's note and agree with the documented findings and plan of care. My medical decision making and observations are found above.

## 2020-09-18 NOTE — ED PROVIDER NOTE - PHYSICAL EXAMINATION
General: uncomfortable appearing woman in mild distress 2/2 pain  Head: normocephalic, atraumatic  Eyes: PERRL, EOMI  Mouth: moist mucous membranes, no oropharyngeal erythema, uvula midline and airway patent  Neck: supple neck, no notable nodule or overlying erythema or swelling on left neck, no lymphadenopathy  CV: tachycardic, no LE edema or tenderness to palpation, peripheral pulses 2+ bilaterally  Respiratory: clear to auscultation bilaterally, no wheezing or crackles  Abdomen: obese, soft, nondistended, tender to palpation of epigastrium, RUQ, and left abdomen  : suprapubic tenderness to palpation but no lower pelvic tenderness to palpation,, no CVAT  Neuro: alert and oriented x3, speech clear, moving all extremities  Skin: no rash noted  Extremities: no tenderness to palpation of joints

## 2020-09-18 NOTE — ED PROVIDER NOTE - PATIENT PORTAL LINK FT
You can access the FollowMyHealth Patient Portal offered by [facility] by registering at the following website: [email or web address]. By joining [employer]’s FollowMyHealth portal, you will also be able to view your health information using other applications (apps) compatible with our system.

## 2020-09-18 NOTE — ED PROVIDER NOTE - NS ED ROS FT
General: +fever  Head: +headache  Eyes: no vision change  ENT: no nasal discharge/congestion, no sore throat  CV: +chest pain  Resp: no SOB, no cough  GI: +abdominal pain, +N/V, no diarrhea, no blood in stool  : +dysuria, no hematuria, no vaginal discharge  MSK: +left neck pain  Skin: no new rash  Neuro: no focal weakness, no change in sensation

## 2020-09-18 NOTE — ED PROVIDER NOTE - CLINICAL SUMMARY MEDICAL DECISION MAKING FREE TEXT BOX
34yoF presents with headache and diffuse abdominal tenderness and chest pain with fever and tachycardia. Will evaluate for sepsis and evaluate for abdominal/urinary etiology of infection. Low suspicion for meningitis despite headache due to prolonged course and no neck stiffness or neuro deficits on exam. Will check blood work, cultures, UA, CT a/p, US, will give fluids and pain medication and reassess.

## 2020-09-18 NOTE — ED PROVIDER NOTE - PROGRESS NOTE DETAILS
Ping Saini, resident MD: pt with improved symptoms after fluids and medication and resting comfortably. awaiting US results and CT. Ping Saini, resident MD: no acute findings on CT. pt reports improvement of symptoms and is resting comfortably. awaiting UA as pt has been unable to give urine sample. Ping Saini, resident MD: UA not suggestive of UTI. discussed CT results and lab results with pt and pt's . pt states improvement of symptoms and would like to go home at this time. likely symptoms 2/2 viral etiology. discussed need for symptomatic management and f/u with PCP. will discharge at this time, discussed return precautions and printed results or lab work and imaging for pt.

## 2020-09-18 NOTE — ED PROVIDER NOTE - OBJECTIVE STATEMENT
34yoF no PMH presents with mid parietal headache with left neck pain intermittently x 1 month with no neck stiffness that has worsened over the month and now has diffuse abdominal pain and mid chest pain with associated nausea and nonbloody emesis, dysuria, and fever x 2 days. No diarrhea or blood in stool, no vaginal discharge. No rash. No trauma. No SOB, cough, sore throat, runny nose. She took tylenol in the morning with minimal relief and came to ED for evaluation. LMP 9/6/20

## 2020-09-18 NOTE — ED PROVIDER NOTE - NSFOLLOWUPINSTRUCTIONS_ED_ALL_ED_FT
You were seen an evaluated in the emergency room for abdominal pain.    Please follow up with your primary care provider in the next few days.    Drink plenty of fluids to stay hydrated. You can drink sports drinks or pedialyte as they provide electrolyte replacement in addition to fluids.     Take 650mg tylenol every 6 hours as needed for pain/fever  Take 400mg ibuprofen every 6 hours as needed for pain/fever, make sure to take with food.    Seek immediate medical attention for any worsening, new, or concerning symptoms.    Please read all attached.

## 2020-09-19 VITALS
HEART RATE: 75 BPM | OXYGEN SATURATION: 99 % | TEMPERATURE: 98 F | DIASTOLIC BLOOD PRESSURE: 60 MMHG | SYSTOLIC BLOOD PRESSURE: 99 MMHG | RESPIRATION RATE: 18 BRPM

## 2020-09-19 LAB
APPEARANCE UR: CLEAR — SIGNIFICANT CHANGE UP
BACTERIA # UR AUTO: NEGATIVE — SIGNIFICANT CHANGE UP
BILIRUB UR-MCNC: NEGATIVE — SIGNIFICANT CHANGE UP
BLOOD UR QL VISUAL: NEGATIVE — SIGNIFICANT CHANGE UP
COLOR SPEC: YELLOW — SIGNIFICANT CHANGE UP
GLUCOSE UR-MCNC: NEGATIVE — SIGNIFICANT CHANGE UP
HCG SERPL-ACNC: < 5 MIU/ML — SIGNIFICANT CHANGE UP
HYALINE CASTS # UR AUTO: NEGATIVE — SIGNIFICANT CHANGE UP
KETONES UR-MCNC: NEGATIVE — SIGNIFICANT CHANGE UP
LEUKOCYTE ESTERASE UR-ACNC: NEGATIVE — SIGNIFICANT CHANGE UP
NITRITE UR-MCNC: NEGATIVE — SIGNIFICANT CHANGE UP
PH UR: 6.5 — SIGNIFICANT CHANGE UP (ref 5–8)
PROT UR-MCNC: 50 — SIGNIFICANT CHANGE UP
RBC CASTS # UR COMP ASSIST: SIGNIFICANT CHANGE UP (ref 0–?)
SP GR SPEC: > 1.04 — HIGH (ref 1–1.04)
SQUAMOUS # UR AUTO: SIGNIFICANT CHANGE UP
UROBILINOGEN FLD QL: NORMAL — SIGNIFICANT CHANGE UP
WBC UR QL: SIGNIFICANT CHANGE UP (ref 0–?)

## 2020-09-19 PROCEDURE — 74177 CT ABD & PELVIS W/CONTRAST: CPT | Mod: 26

## 2020-09-19 NOTE — ED ADULT NURSE REASSESSMENT NOTE - NS ED NURSE REASSESS COMMENT FT1
Break Coverage RN: Patient A&OX4, ambulatory. Patient resting comfortably at this time, denies any abdominal pain or discomfort. Patient vital signs as noted. Awaiting urine collection at present time, patient aware. Patient in no acute distress, respirations even and unlabored. Will continue to monitor.

## 2020-09-20 LAB
CULTURE RESULTS: SIGNIFICANT CHANGE UP
SPECIMEN SOURCE: SIGNIFICANT CHANGE UP

## 2020-09-24 LAB
CULTURE RESULTS: SIGNIFICANT CHANGE UP
CULTURE RESULTS: SIGNIFICANT CHANGE UP
SPECIMEN SOURCE: SIGNIFICANT CHANGE UP
SPECIMEN SOURCE: SIGNIFICANT CHANGE UP

## 2020-11-09 ENCOUNTER — NON-APPOINTMENT (OUTPATIENT)
Age: 35
End: 2020-11-09

## 2021-01-31 ENCOUNTER — EMERGENCY (EMERGENCY)
Facility: HOSPITAL | Age: 36
LOS: 1 days | Discharge: HOME | End: 2021-01-31
Attending: STUDENT IN AN ORGANIZED HEALTH CARE EDUCATION/TRAINING PROGRAM | Admitting: STUDENT IN AN ORGANIZED HEALTH CARE EDUCATION/TRAINING PROGRAM
Payer: MEDICAID

## 2021-01-31 VITALS
RESPIRATION RATE: 18 BRPM | HEART RATE: 61 BPM | SYSTOLIC BLOOD PRESSURE: 121 MMHG | TEMPERATURE: 98 F | OXYGEN SATURATION: 100 % | DIASTOLIC BLOOD PRESSURE: 83 MMHG

## 2021-01-31 VITALS
DIASTOLIC BLOOD PRESSURE: 68 MMHG | TEMPERATURE: 98 F | OXYGEN SATURATION: 100 % | RESPIRATION RATE: 18 BRPM | SYSTOLIC BLOOD PRESSURE: 108 MMHG | HEART RATE: 63 BPM

## 2021-01-31 LAB
ALBUMIN SERPL ELPH-MCNC: 4 G/DL — SIGNIFICANT CHANGE UP (ref 3.3–5)
ALP SERPL-CCNC: 69 U/L — SIGNIFICANT CHANGE UP (ref 40–120)
ALT FLD-CCNC: 15 U/L — SIGNIFICANT CHANGE UP (ref 4–33)
ANION GAP SERPL CALC-SCNC: 9 MMOL/L — SIGNIFICANT CHANGE UP (ref 7–14)
APPEARANCE UR: CLEAR — SIGNIFICANT CHANGE UP
APTT BLD: 39.8 SEC — HIGH (ref 27–36.3)
AST SERPL-CCNC: 13 U/L — SIGNIFICANT CHANGE UP (ref 4–32)
BASOPHILS # BLD AUTO: 0.02 K/UL — SIGNIFICANT CHANGE UP (ref 0–0.2)
BASOPHILS NFR BLD AUTO: 0.3 % — SIGNIFICANT CHANGE UP (ref 0–2)
BILIRUB SERPL-MCNC: 0.4 MG/DL — SIGNIFICANT CHANGE UP (ref 0.2–1.2)
BILIRUB UR-MCNC: NEGATIVE — SIGNIFICANT CHANGE UP
BLD GP AB SCN SERPL QL: NEGATIVE — SIGNIFICANT CHANGE UP
BUN SERPL-MCNC: 9 MG/DL — SIGNIFICANT CHANGE UP (ref 7–23)
CALCIUM SERPL-MCNC: 9.3 MG/DL — SIGNIFICANT CHANGE UP (ref 8.4–10.5)
CHLORIDE SERPL-SCNC: 107 MMOL/L — SIGNIFICANT CHANGE UP (ref 98–107)
CO2 SERPL-SCNC: 21 MMOL/L — LOW (ref 22–31)
COLOR SPEC: SIGNIFICANT CHANGE UP
CREAT SERPL-MCNC: 0.49 MG/DL — LOW (ref 0.5–1.3)
DIFF PNL FLD: ABNORMAL
EOSINOPHIL # BLD AUTO: 0.08 K/UL — SIGNIFICANT CHANGE UP (ref 0–0.5)
EOSINOPHIL NFR BLD AUTO: 1.3 % — SIGNIFICANT CHANGE UP (ref 0–6)
GLUCOSE SERPL-MCNC: 96 MG/DL — SIGNIFICANT CHANGE UP (ref 70–99)
GLUCOSE UR QL: NEGATIVE — SIGNIFICANT CHANGE UP
HCG SERPL-ACNC: 9.2 MIU/ML — SIGNIFICANT CHANGE UP
HCT VFR BLD CALC: 38.5 % — SIGNIFICANT CHANGE UP (ref 34.5–45)
HGB BLD-MCNC: 11 G/DL — LOW (ref 11.5–15.5)
IANC: 3.92 K/UL — SIGNIFICANT CHANGE UP (ref 1.5–8.5)
IMM GRANULOCYTES NFR BLD AUTO: 1 % — SIGNIFICANT CHANGE UP (ref 0–1.5)
INR BLD: 1.21 RATIO — HIGH (ref 0.88–1.16)
KETONES UR-MCNC: NEGATIVE — SIGNIFICANT CHANGE UP
LEUKOCYTE ESTERASE UR-ACNC: NEGATIVE — SIGNIFICANT CHANGE UP
LYMPHOCYTES # BLD AUTO: 1.67 K/UL — SIGNIFICANT CHANGE UP (ref 1–3.3)
LYMPHOCYTES # BLD AUTO: 27 % — SIGNIFICANT CHANGE UP (ref 13–44)
MCHC RBC-ENTMCNC: 25.1 PG — LOW (ref 27–34)
MCHC RBC-ENTMCNC: 28.6 GM/DL — LOW (ref 32–36)
MCV RBC AUTO: 87.9 FL — SIGNIFICANT CHANGE UP (ref 80–100)
MONOCYTES # BLD AUTO: 0.43 K/UL — SIGNIFICANT CHANGE UP (ref 0–0.9)
MONOCYTES NFR BLD AUTO: 7 % — SIGNIFICANT CHANGE UP (ref 2–14)
NEUTROPHILS # BLD AUTO: 3.92 K/UL — SIGNIFICANT CHANGE UP (ref 1.8–7.4)
NEUTROPHILS NFR BLD AUTO: 63.4 % — SIGNIFICANT CHANGE UP (ref 43–77)
NITRITE UR-MCNC: NEGATIVE — SIGNIFICANT CHANGE UP
NRBC # BLD: 0 /100 WBCS — SIGNIFICANT CHANGE UP
NRBC # FLD: 0 K/UL — SIGNIFICANT CHANGE UP
PH UR: 6 — SIGNIFICANT CHANGE UP (ref 5–8)
PLATELET # BLD AUTO: 384 K/UL — SIGNIFICANT CHANGE UP (ref 150–400)
POTASSIUM SERPL-MCNC: 4.1 MMOL/L — SIGNIFICANT CHANGE UP (ref 3.5–5.3)
POTASSIUM SERPL-SCNC: 4.1 MMOL/L — SIGNIFICANT CHANGE UP (ref 3.5–5.3)
PROT SERPL-MCNC: 7.8 G/DL — SIGNIFICANT CHANGE UP (ref 6–8.3)
PROT UR-MCNC: NEGATIVE — SIGNIFICANT CHANGE UP
PROTHROM AB SERPL-ACNC: 13.8 SEC — HIGH (ref 10.6–13.6)
RBC # BLD: 4.38 M/UL — SIGNIFICANT CHANGE UP (ref 3.8–5.2)
RBC # FLD: 13.8 % — SIGNIFICANT CHANGE UP (ref 10.3–14.5)
RH IG SCN BLD-IMP: POSITIVE — SIGNIFICANT CHANGE UP
SODIUM SERPL-SCNC: 137 MMOL/L — SIGNIFICANT CHANGE UP (ref 135–145)
SP GR SPEC: 1.01 — SIGNIFICANT CHANGE UP (ref 1.01–1.02)
UROBILINOGEN FLD QL: SIGNIFICANT CHANGE UP
WBC # BLD: 6.18 K/UL — SIGNIFICANT CHANGE UP (ref 3.8–10.5)
WBC # FLD AUTO: 6.18 K/UL — SIGNIFICANT CHANGE UP (ref 3.8–10.5)

## 2021-01-31 PROCEDURE — 76830 TRANSVAGINAL US NON-OB: CPT | Mod: 26

## 2021-01-31 PROCEDURE — 99284 EMERGENCY DEPT VISIT MOD MDM: CPT

## 2021-01-31 NOTE — ED ADULT NURSE NOTE - SUICIDE SCREENING QUESTION 1
Obstructive Sleep Apnea - PAP Follow-Up Visit:    Chief Complaint   Patient presents with     CPAP Follow Up     no concerns with sleep and no concerns with machine.        Socorro Oakes comes in today for follow-up of their moderate obstructive sleep apnea, managed with CPAP.     Pertinent PMHx of ADD, schizoaffective disorder, obesity.    She returns today for annual follow-up and feels she is doing well with her CPAP and sleeping well, no concerns outside of needed new supplies.    CPAP download from 8/6/2018 - 9/4/2018 on set pressure 10 cm H2O.  Average daily usage of 10 hours 13 minutes, used >= 4 hours on 100% of nights.  AHI 0.6.    Sleep Testing To Date:  11/16/2007 - Split-night PSG with AHI 19.2, cristopher desat listed as 59% and period of sustained hypoxemia, CPAP 9 optimal. Partial MSLT performed following day with no sleep nor SOREM on 2 naps  4/19/2016 - JOSE on CPAP 9 cm H2O.  Cristopher desat 75%, time < 88% of 27.4 minutes.  Graph suggestive of one period of sustained hypoxemia and two periods of repetitive oxygen desaturations consistent with residual ALEXA.  4/26/2016 - Titration PSG, CPAP 10 appearing optimal.       Problem List:  Patient Active Problem List    Diagnosis Date Noted     Health Care Home 04/20/2011     Priority: High     DX V65.8 REPLACED WITH [facility] HEALTH CARE HOME (04/08/2013)         MRSA      Priority: High     SITE - WOUND 7/13/05; 8/12/2009:  Pt stated that she should be discontinued MRSA status as she had screening done. We could not obtain these records in her Medical chart, so we could not validate this.  Pt still MRSA + STATUS.       Class 3 obesity due to excess calories without serious comorbidity with body mass index (BMI) of 40.0 to 44.9 in adult (H) 03/19/2018     Priority: Medium     Essential hypertension, benign 11/14/2016     Priority: Medium     Memory loss 11/27/2012     Priority: Medium     Moderate major depression (H) 03/06/2012     Priority: Medium      "Schizophrenia (H) 03/06/2012     Priority: Medium     Follow with psychiatry        ADD (attention deficit disorder with hyperactivity) 03/06/2012     Priority: Medium     Problem list name updated by automated process. Provider to review and confirm       Restless legs syndrome (RLS) 03/06/2012     Priority: Medium     Migraine 01/25/2012     Priority: Medium     Started on verapamil.        Sleep apnea 10/26/2011     Priority: Medium     GERD (gastroesophageal reflux disease) 04/26/2011     Priority: Medium     DJD (degenerative joint disease), lumbar 04/26/2011     Priority: Medium     [facility]OHN[facility] PAIN MANAGEMENT   ON VICODIN # 15   FLEXERIL # 60   PAIN CONTRACT SIGNED.        Hyperlipidemia LDL goal <130 12/23/2010     Priority: Medium     h/o multiple concussion  11/05/2008     Priority: Medium          /72  Pulse 77  Ht 1.613 m (5' 3.5\")  Wt 114.3 kg (252 lb)  LMP 08/15/2018 (Approximate)  SpO2 95%  BMI 43.94 kg/m2    Impression/Plan:    1.) Moderate ALEXA with sustained / significant desaturations  - CPAP 10 cm H2O optimal per titration PSG on 4/26/2016  - Appears well controlled with CPAP at 10 cm H2O, continue on current settings  - Follow-up in 2 years       Socorro Oakes will follow up in about 2 year(s).     Twenty-five minutes spent with patient, all of which were spent face-to-face counseling, consulting, coordinating plan of care.      Everton Martínez MD, MD    CC:  Jennifer Riley  " No

## 2021-01-31 NOTE — ED PROVIDER NOTE - OBJECTIVE STATEMENT
36 Y/O F  LMP 12/16  PMH Gestational DM C/O vaginal bleeding. Pt states that she had spotting earlier today, did not change her pad and denies lightheadedness or dizziness. Pt states this is a highly desired pregnancy and she is concerned that she could be miscarrying 36 Y/O F  LMP 12/16  PMH Gestational DM C/O vaginal bleeding. Pt states that she had spotting earlier today, did not change her pad and denies lightheadedness or dizziness. Pt states this is a highly desired pregnancy and she is concerned that she could be miscarrying. Pt denies pain/nausea/vomiting/fever/chills or discharge.

## 2021-01-31 NOTE — ED ADULT NURSE NOTE - OBJECTIVE STATEMENT
Pt received AOx4, ambulatory at baseline presents to the ED with chief complaint of vaginal spotting that started today, denies clots. Pt states LMP was 12/16, did a home pregnancy test yesterday which came back positive. PMHX of 2 miscarriages in the past. Denies CP, SOB, NVD, abd pain, dizziness, dysuria, chills, fever, cough at the moment. 20G placed in RAC. Labs sent.

## 2021-01-31 NOTE — ED PROVIDER NOTE - PROGRESS NOTE DETAILS
MIKE Hewitt: Pt reassessed with [facility]  [id number], pt advised to F/U with an OBGYN as soon as possible, will D/C with discharge lounge.

## 2021-01-31 NOTE — ED PROVIDER NOTE - NSFOLLOWUPINSTRUCTIONS_ED_ALL_ED_FT
Follow up with your OBGYN as soon as possible. If you do not have an Orthopedist call  to schedule an appointment. Advance activity as tolerated.  Continue all previously prescribed medications as directed.  Follow up with your primary care physician in 48-72 hours- bring copies of your results.  Return to the ER for worsening or persistent symptoms, and/or ANY NEW OR CONCERNING SYMPTOMS. This includes but is not limited to lightheadedness, heavy bleeding or for any other symptoms that concern you. If you have issues obtaining follow up, please call: [phone] to obtain a doctor or specialist who takes your insurance in your area.  You may call [phone] to make an appointment with the internal medicine clinic.

## 2021-01-31 NOTE — ED PROVIDER NOTE - CLINICAL SUMMARY MEDICAL DECISION MAKING FREE TEXT BOX
36 Y/O F  LMP 12/16  PMH Gestational DM C/O vaginal bleeding. Pt states that she had spotting earlier today, did not change her pad and denies lightheadedness or dizziness. Plan is U/S to eval for IUP, labs to eval for anemia or electrolyte disturbance. Pt is well appearing, VSS.

## 2021-01-31 NOTE — ED PROVIDER NOTE - PATIENT PORTAL LINK FT
You can access the FollowMyHealth Patient Portal offered by [facility] by registering at the following website: [email or web address]. By joining [employer]’s FollowMyHealth portal, you will also be able to view your health information using other applications (apps) compatible with our system.

## 2021-01-31 NOTE — ED PROVIDER NOTE - ATTENDING CONTRIBUTION TO CARE
Rich BUTLER: I agree with the above provided history and exam and addend/modify it as follows.    35F  w/out pmh - p/w vaginal spotting since last night, LMP 12/. No abd pain, vomiting, dysuria/hematuria, sob, cough, fever. Pt concerned for miscarriage, has no other complaints.     On exam, abd soft nontender nondistended.    Concern for threatened miscarriage vs early / normal bleeding pregnancy, plan to check US, labs.     I Jayesh Villanueva MD performed a history and physical exam of the patient and discussed their management with the resident and /or advanced care provider. I reviewed the resident and /or ACP's note and agree with the documented findings and plan of care. My medical decision making and observations are found above.

## 2021-02-01 LAB
CULTURE RESULTS: SIGNIFICANT CHANGE UP
SPECIMEN SOURCE: SIGNIFICANT CHANGE UP

## 2022-01-12 ENCOUNTER — OUTPATIENT (OUTPATIENT)
Dept: OUTPATIENT SERVICES | Facility: HOSPITAL | Age: 37
LOS: 1 days | End: 2022-01-12

## 2022-01-12 DIAGNOSIS — Z20.822 CONTACT WITH AND (SUSPECTED) EXPOSURE TO COVID-19: ICD-10-CM

## 2022-01-12 PROBLEM — O24.419 GESTATIONAL DIABETES MELLITUS IN PREGNANCY, UNSPECIFIED CONTROL: Chronic | Status: ACTIVE | Noted: 2021-01-31

## 2022-01-12 LAB — SARS-COV-2 RNA SPEC QL NAA+PROBE: DETECTED

## 2022-02-16 ENCOUNTER — ASOB RESULT (OUTPATIENT)
Age: 37
End: 2022-02-16

## 2022-02-16 ENCOUNTER — APPOINTMENT (OUTPATIENT)
Dept: MATERNAL FETAL MEDICINE | Facility: CLINIC | Age: 37
End: 2022-02-16
Payer: MEDICAID

## 2022-02-16 PROCEDURE — 99202 OFFICE O/P NEW SF 15 MIN: CPT | Mod: TH,95

## 2022-02-18 ENCOUNTER — APPOINTMENT (OUTPATIENT)
Dept: ANTEPARTUM | Facility: CLINIC | Age: 37
End: 2022-02-18
Payer: MEDICAID

## 2022-02-18 DIAGNOSIS — Z87.898 PERSONAL HISTORY OF OTHER SPECIFIED CONDITIONS: ICD-10-CM

## 2022-02-18 PROCEDURE — 36415 COLL VENOUS BLD VENIPUNCTURE: CPT

## 2022-02-28 ENCOUNTER — APPOINTMENT (OUTPATIENT)
Dept: MATERNAL FETAL MEDICINE | Facility: CLINIC | Age: 37
End: 2022-02-28

## 2022-02-28 ENCOUNTER — APPOINTMENT (OUTPATIENT)
Dept: ANTEPARTUM | Facility: CLINIC | Age: 37
End: 2022-02-28

## 2022-02-28 DIAGNOSIS — O03.9 COMPLETE OR UNSPECIFIED SPONTANEOUS ABORTION W/OUT COMPLICATION: ICD-10-CM

## 2022-03-04 ENCOUNTER — EMERGENCY (EMERGENCY)
Facility: HOSPITAL | Age: 37
LOS: 1 days | Discharge: HOME | End: 2022-03-04
Attending: EMERGENCY MEDICINE | Admitting: EMERGENCY MEDICINE
Payer: MEDICAID

## 2022-03-04 VITALS
HEART RATE: 79 BPM | DIASTOLIC BLOOD PRESSURE: 62 MMHG | SYSTOLIC BLOOD PRESSURE: 103 MMHG | RESPIRATION RATE: 16 BRPM | OXYGEN SATURATION: 100 % | TEMPERATURE: 99 F

## 2022-03-04 VITALS
SYSTOLIC BLOOD PRESSURE: 136 MMHG | DIASTOLIC BLOOD PRESSURE: 67 MMHG | TEMPERATURE: 98 F | RESPIRATION RATE: 16 BRPM | HEART RATE: 77 BPM | OXYGEN SATURATION: 100 %

## 2022-03-04 LAB
ALBUMIN SERPL ELPH-MCNC: 4.1 G/DL — SIGNIFICANT CHANGE UP (ref 3.3–5)
ALP SERPL-CCNC: 68 U/L — SIGNIFICANT CHANGE UP (ref 40–120)
ALT FLD-CCNC: 35 U/L — HIGH (ref 4–33)
ANION GAP SERPL CALC-SCNC: 9 MMOL/L — SIGNIFICANT CHANGE UP (ref 7–14)
APPEARANCE UR: CLEAR — SIGNIFICANT CHANGE UP
APTT BLD: 38.5 SEC — HIGH (ref 27–36.3)
AST SERPL-CCNC: 30 U/L — SIGNIFICANT CHANGE UP (ref 4–32)
BACTERIA # UR AUTO: ABNORMAL
BASOPHILS # BLD AUTO: 0.04 K/UL — SIGNIFICANT CHANGE UP (ref 0–0.2)
BASOPHILS NFR BLD AUTO: 0.6 % — SIGNIFICANT CHANGE UP (ref 0–2)
BILIRUB SERPL-MCNC: 0.2 MG/DL — SIGNIFICANT CHANGE UP (ref 0.2–1.2)
BILIRUB UR-MCNC: NEGATIVE — SIGNIFICANT CHANGE UP
BLD GP AB SCN SERPL QL: NEGATIVE — SIGNIFICANT CHANGE UP
BUN SERPL-MCNC: 8 MG/DL — SIGNIFICANT CHANGE UP (ref 7–23)
CALCIUM SERPL-MCNC: 8.6 MG/DL — SIGNIFICANT CHANGE UP (ref 8.4–10.5)
CHLORIDE SERPL-SCNC: 104 MMOL/L — SIGNIFICANT CHANGE UP (ref 98–107)
CO2 SERPL-SCNC: 20 MMOL/L — LOW (ref 22–31)
COLOR SPEC: SIGNIFICANT CHANGE UP
CREAT SERPL-MCNC: 0.45 MG/DL — LOW (ref 0.5–1.3)
DIFF PNL FLD: NEGATIVE — SIGNIFICANT CHANGE UP
EGFR: 129 ML/MIN/1.73M2 — SIGNIFICANT CHANGE UP
EOSINOPHIL # BLD AUTO: 0.15 K/UL — SIGNIFICANT CHANGE UP (ref 0–0.5)
EOSINOPHIL NFR BLD AUTO: 2.1 % — SIGNIFICANT CHANGE UP (ref 0–6)
EPI CELLS # UR: 9 /HPF — HIGH (ref 0–5)
FLUAV AG NPH QL: SIGNIFICANT CHANGE UP
FLUBV AG NPH QL: SIGNIFICANT CHANGE UP
GLUCOSE SERPL-MCNC: 112 MG/DL — HIGH (ref 70–99)
GLUCOSE UR QL: NEGATIVE — SIGNIFICANT CHANGE UP
HCG SERPL-ACNC: <5 MIU/ML — SIGNIFICANT CHANGE UP
HCT VFR BLD CALC: 34.7 % — SIGNIFICANT CHANGE UP (ref 34.5–45)
HGB BLD-MCNC: 10.6 G/DL — LOW (ref 11.5–15.5)
HYALINE CASTS # UR AUTO: 4 /LPF — SIGNIFICANT CHANGE UP (ref 0–7)
IANC: 4.65 K/UL — SIGNIFICANT CHANGE UP (ref 1.5–8.5)
IMM GRANULOCYTES NFR BLD AUTO: 1 % — SIGNIFICANT CHANGE UP (ref 0–1.5)
INR BLD: 1.12 RATIO — SIGNIFICANT CHANGE UP (ref 0.88–1.16)
KETONES UR-MCNC: NEGATIVE — SIGNIFICANT CHANGE UP
LEUKOCYTE ESTERASE UR-ACNC: NEGATIVE — SIGNIFICANT CHANGE UP
LYMPHOCYTES # BLD AUTO: 1.88 K/UL — SIGNIFICANT CHANGE UP (ref 1–3.3)
LYMPHOCYTES # BLD AUTO: 26.1 % — SIGNIFICANT CHANGE UP (ref 13–44)
MCHC RBC-ENTMCNC: 26.1 PG — LOW (ref 27–34)
MCHC RBC-ENTMCNC: 30.5 GM/DL — LOW (ref 32–36)
MCV RBC AUTO: 85.5 FL — SIGNIFICANT CHANGE UP (ref 80–100)
MONOCYTES # BLD AUTO: 0.42 K/UL — SIGNIFICANT CHANGE UP (ref 0–0.9)
MONOCYTES NFR BLD AUTO: 5.8 % — SIGNIFICANT CHANGE UP (ref 2–14)
NEUTROPHILS # BLD AUTO: 4.65 K/UL — SIGNIFICANT CHANGE UP (ref 1.8–7.4)
NEUTROPHILS NFR BLD AUTO: 64.4 % — SIGNIFICANT CHANGE UP (ref 43–77)
NITRITE UR-MCNC: NEGATIVE — SIGNIFICANT CHANGE UP
NRBC # BLD: 0 /100 WBCS — SIGNIFICANT CHANGE UP
NRBC # FLD: 0 K/UL — SIGNIFICANT CHANGE UP
PH UR: 7 — SIGNIFICANT CHANGE UP (ref 5–8)
PLATELET # BLD AUTO: 424 K/UL — HIGH (ref 150–400)
POTASSIUM SERPL-MCNC: 3.8 MMOL/L — SIGNIFICANT CHANGE UP (ref 3.5–5.3)
POTASSIUM SERPL-SCNC: 3.8 MMOL/L — SIGNIFICANT CHANGE UP (ref 3.5–5.3)
PROT SERPL-MCNC: 7.6 G/DL — SIGNIFICANT CHANGE UP (ref 6–8.3)
PROT UR-MCNC: ABNORMAL
PROTHROM AB SERPL-ACNC: 13 SEC — SIGNIFICANT CHANGE UP (ref 10.5–13.4)
RBC # BLD: 4.06 M/UL — SIGNIFICANT CHANGE UP (ref 3.8–5.2)
RBC # FLD: 14.6 % — HIGH (ref 10.3–14.5)
RBC CASTS # UR COMP ASSIST: 0 /HPF — SIGNIFICANT CHANGE UP (ref 0–4)
RH IG SCN BLD-IMP: POSITIVE — SIGNIFICANT CHANGE UP
RSV RNA NPH QL NAA+NON-PROBE: SIGNIFICANT CHANGE UP
SARS-COV-2 RNA SPEC QL NAA+PROBE: SIGNIFICANT CHANGE UP
SODIUM SERPL-SCNC: 133 MMOL/L — LOW (ref 135–145)
SP GR SPEC: >1.05 (ref 1–1.05)
UROBILINOGEN FLD QL: SIGNIFICANT CHANGE UP
WBC # BLD: 7.21 K/UL — SIGNIFICANT CHANGE UP (ref 3.8–10.5)
WBC # FLD AUTO: 7.21 K/UL — SIGNIFICANT CHANGE UP (ref 3.8–10.5)
WBC UR QL: 7 /HPF — HIGH (ref 0–5)

## 2022-03-04 PROCEDURE — 99285 EMERGENCY DEPT VISIT HI MDM: CPT | Mod: 25

## 2022-03-04 PROCEDURE — 76705 ECHO EXAM OF ABDOMEN: CPT | Mod: 26

## 2022-03-04 PROCEDURE — G1004: CPT

## 2022-03-04 PROCEDURE — 74177 CT ABD & PELVIS W/CONTRAST: CPT | Mod: 26,MG

## 2022-03-04 PROCEDURE — 93010 ELECTROCARDIOGRAM REPORT: CPT

## 2022-03-04 PROCEDURE — 71046 X-RAY EXAM CHEST 2 VIEWS: CPT | Mod: 26

## 2022-03-04 RX ORDER — MORPHINE SULFATE 50 MG/1
4 CAPSULE, EXTENDED RELEASE ORAL ONCE
Refills: 0 | Status: DISCONTINUED | OUTPATIENT
Start: 2022-03-04 | End: 2022-03-04

## 2022-03-04 RX ORDER — SUCRALFATE 1 G
1 TABLET ORAL ONCE
Refills: 0 | Status: COMPLETED | OUTPATIENT
Start: 2022-03-04 | End: 2022-03-04

## 2022-03-04 RX ORDER — KETOROLAC TROMETHAMINE 30 MG/ML
15 SYRINGE (ML) INJECTION ONCE
Refills: 0 | Status: DISCONTINUED | OUTPATIENT
Start: 2022-03-04 | End: 2022-03-04

## 2022-03-04 RX ADMIN — MORPHINE SULFATE 4 MILLIGRAM(S): 50 CAPSULE, EXTENDED RELEASE ORAL at 10:14

## 2022-03-04 RX ADMIN — Medication 15 MILLIGRAM(S): at 16:08

## 2022-03-04 RX ADMIN — Medication 1 GRAM(S): at 10:14

## 2022-03-04 NOTE — CONSULT NOTE ADULT - ASSESSMENT
36yF w/ biliary colic    - No signs or symptoms of acute cholecystitis  - Recommend PO challenge  - If Pt passes PO challenge, can f/u w/ Dr. Shayan Jenkins as out pt for elective lap bolivar  - Have Pt call [phone] to schedule a follow up appointment  - Pt discussed w/ Dr. Gregory Swartz PGY-3

## 2022-03-04 NOTE — ED PROVIDER NOTE - CLINICAL SUMMARY MEDICAL DECISION MAKING FREE TEXT BOX
36 Y F presenting with 3 days of Rib/RUQ pain, primary concern for cholycystitis vs. acute cholylithiasis, workup to assess for pleural effusion, HAJA, low clinical concern for PE Risk factors for pulmonary embolism by PERC criteria. Common labs, cxr, US RUQ, symptomatic improvement, likely dc.

## 2022-03-04 NOTE — ED PROVIDER NOTE - ATTENDING CONTRIBUTION TO CARE
Attending Statement: I have personally seen and examined this patient. I have fully participated in the care of this patient. I have reviewed all pertinent clinical information, including history physical exam, plan and the Resident's note and agree except as noted   36yoF hx of gestational DM pw three days of RUQ pain. Worse with laying down and moving. pain non-radiating. no sob. no DALTON. no cough no vomiting or diarrhea. no fever. no dysuria. Seen at  had cxr/ekg wnl per pt.  Vital signs noted. mmm. tearful, looks in pain. normal S1-S2 good air entry. soft obese female tender in the RUQ, +voluntary guarding no rebound no cvat. no rash/ecchymosis.   plan labs, ekg, cxr, RUQ us, IVF, pain med, keep npo.

## 2022-03-04 NOTE — ED PROVIDER NOTE - NSFOLLOWUPINSTRUCTIONS_ED_ALL_ED_FT
Abdominal Pain    Many things can cause abdominal pain. Many times, abdominal pain is not caused by a disease and will improve without treatment. Your health care provider will do a physical exam to determine if there is a dangerous cause of your pain; blood tests and imaging may help determine the cause of your pain. However, in many cases, no cause may be found and you may need further testing as an outpatient. Monitor your abdominal pain for any changes.     Your imaging demonstrated stones in the gallbladder (cholylithiasis) with no signs of inflammation or infection. Avoid fatty foods, chocolate, butter, and cheeses. Take Motrin 600mg every 8hrs with food for pain. Take tylenol 1000 mg every 6-8 hours no more than 4000 per day. Followup with general surgery within the next 1-2 weeks.    SEEK IMMEDIATE MEDICAL CARE IF YOU HAVE ANY OF THE FOLLOWING SYMPTOMS: worsening abdominal pain, uncontrollable vomiting, profuse diarrhea, inability to have bowel movements or pass gas, black or bloody stools, fever accompanying chest pain or back pain, or fainting. These symptoms may represent a serious problem that is an emergency. Do not wait to see if the symptoms will go away. Get medical help right away. Call 911 and do not drive yourself to the hospital. Abdominal Pain    Many things can cause abdominal pain. Many times, abdominal pain is not caused by a disease and will improve without treatment. Your health care provider will do a physical exam to determine if there is a dangerous cause of your pain; blood tests and imaging may help determine the cause of your pain. However, in many cases, no cause may be found and you may need further testing as an outpatient. Monitor your abdominal pain for any changes.     Your imaging demonstrated stones in the gallbladder (cholylithiasis) with no signs of inflammation or infection. This may be caused by costochondritis (inflammation of the bone). Avoid fatty foods, chocolate, butter, and cheeses. Take Motrin 600mg every 8hrs with food for pain. Take tylenol 1000 mg every 6-8 hours no more than 4000 per day. Followup with general surgery within the next 1-2 weeks.    SEEK IMMEDIATE MEDICAL CARE IF YOU HAVE ANY OF THE FOLLOWING SYMPTOMS: worsening abdominal pain, uncontrollable vomiting, profuse diarrhea, inability to have bowel movements or pass gas, black or bloody stools, fever accompanying chest pain or back pain, or fainting. These symptoms may represent a serious problem that is an emergency. Do not wait to see if the symptoms will go away. Get medical help right away. Call 911 and do not drive yourself to the hospital.

## 2022-03-04 NOTE — ED PROVIDER NOTE - PROGRESS NOTE DETAILS
Temo Flores MD:  Re-assessed, S/P morphine, ketorolac, remians in significant pain, patient initially amenable to outpatient assessment with surgery, requesting surgical consultation. pt stable, pending surgery recommendation. Sign out to night team Temo Flores MD:  Discussed with surgery no acute findings, DC with followup.

## 2022-03-04 NOTE — ED PROVIDER NOTE - PATIENT PORTAL LINK FT
You can access the FollowMyHealth Patient Portal offered by [facility] by registering at the following website: [email or web address]. By joining [employer]’s FollowMyHealth portal, you will also be able to view your health information using other applications (apps) compatible with our system. You can access the FollowMyHealth Patient Portal offered by [facility] by registering at the following website: [email or web address]. By joining [employer]’s FollowMyHealth portal, you will also be able to view your health information using other applications (apps) compatible with our system.

## 2022-03-04 NOTE — ED PROVIDER NOTE - OBJECTIVE STATEMENT
36 Y F presenting with rib pain, R. sided pain started 3 days ago, was working at home, , presented to urgent care due to pain, negative xray/ ekg, minimal improvement with tylenol/motrin, only attempted X2 days. pain is persistent worsening with arm motion, light tough, no alleviating symptoms.Worse with inspiration, laying flat, some post-prandial painfirm palpation, no associated nausea, vomiting, fever, chills, 36 Y F presenting with rib pain, R. sided pain started 3 days ago, was working at home, , presented to urgent care due to pain, negative xray/ ekg, minimal improvement with tylenol/motrin, only attempted X2 days. pain is persistent worsening with arm motion, light tough, no alleviating symptoms. Worse with inspiration, laying flat, some post-prandial painfirm palpation, no associated nausea, vomiting, fever, chills,

## 2022-03-04 NOTE — ED PROVIDER NOTE - NS ED ROS FT
GENERAL: No fever or chills  EYES: No change in vision  HEENT: No trouble swallowing or speaking  CARDIAC: No chest pain  PULMONARY: No cough or SOB  GI: + abdominal pain RUQ, no nausea or no vomiting, no diarrhea or constipation  : No changes in urination  SKIN: No rashes  NEURO: No headache, no numbness  MSK: No joint pain  Otherwise as HPI or negative.

## 2022-03-04 NOTE — ED PROVIDER NOTE - PHYSICAL EXAMINATION
Physical Exam:  General: NAD, Conversive  Eyes: EOMI, Conjunctia and sclera clear  Neck: No JVD  Lungs: Clear to auscultation bilaterally, no wheeze, no rhonchi  Heart: Normal S1, S2, no murmurs  Abdomen: Soft, exquisitely tender RUQ,  nondistended, no CVA tenderness  Extremities: 2+ peripheral pulses, no edema  Psych: AAO X3  Neurologic: Non-focal

## 2022-03-04 NOTE — ED ADULT TRIAGE NOTE - CHIEF COMPLAINT QUOTE
c/o pain under right breast x 3 days. Pain reproducible with movement. Pt went to Urgent Care and had xrays done that were negative. Has been taking Ibuprofen/Tylenol with no relief.

## 2022-03-04 NOTE — CONSULT NOTE ADULT - SUBJECTIVE AND OBJECTIVE BOX
General Surgery Consult  Consulting attending: Dr. Jenkins    HPI: 36yF w/ no sig PMH/PSH. Pt presented to [facility] ED on 3/4/22 c/o 3 days of RUQ abdominal pain. She denies nausea or emesis. She denies fevers or chills. She is passing flatus and having bowel movements. She denies melena, hematochezia, or BRBPR. She has never had an EGD or C-scope. Last PO intake was the morning of presentation and did not alter her pain. She denies experiencing pain like this before. She denies any recent trauma.      PAST MEDICAL HISTORY:  No pertinent past medical history    Gestational diabetes        PAST SURGICAL HISTORY:  No significant past surgical history        MEDICATIONS:      ALLERGIES:  proton pump inhibitors (Rash; Urticaria; Pruritus; Hives)  sulfa topicals (Rash; Urticaria; Pruritus; Hives)      VITALS & I/Os:  Vital Signs Last 24 Hrs  T(C): 37 (04 Mar 2022 16:08), Max: 37 (04 Mar 2022 16:08)  T(F): 98.6 (04 Mar 2022 16:08), Max: 98.6 (04 Mar 2022 16:08)  HR: 79 (04 Mar 2022 16:08) (72 - 79)  BP: 103/62 (04 Mar 2022 16:08) (103/62 - 138/76)  BP(mean): --  RR: 16 (04 Mar 2022 16:08) (16 - 18)  SpO2: 100% (04 Mar 2022 16:08) (99% - 100%)    I&O's Summary      PHYSICAL EXAM:  General: No acute distress  Respiratory: Nonlabored  Cardiovascular: RRR  Abdominal: Soft, obese, nontender, negative Sellers's sign. No rebound or guarding. No organomegaly, no palpable mass.  Extremities: Warm    LABS:                        10.6   7.21  )-----------( 424      ( 04 Mar 2022 10:39 )             34.7     03-04    133<L>  |  104  |  8   ----------------------------<  112<H>  3.8   |  20<L>  |  0.45<L>    Ca    8.6      04 Mar 2022 10:39    TPro  7.6  /  Alb  4.1  /  TBili  0.2  /  DBili  x   /  AST  30  /  ALT  35<H>  /  AlkPhos  68  03-04    Lactate:    PT/INR - ( 04 Mar 2022 10:39 )   PT: 13.0 sec;   INR: 1.12 ratio         PTT - ( 04 Mar 2022 10:39 )  PTT:38.5 sec      Urinalysis Basic - ( 04 Mar 2022 16:20 )    Color: Light Yellow / Appearance: Clear / SG: >1.050 / pH: x  Gluc: x / Ketone: Negative  / Bili: Negative / Urobili: <2 mg/dL   Blood: x / Protein: 30 mg/dL / Nitrite: Negative   Leuk Esterase: Negative / RBC: 0 /HPF / WBC 7 /HPF   Sq Epi: x / Non Sq Epi: 9 /HPF / Bacteria: Few        IMAGING:  < from: US Abdomen Upper Quadrant Right (03.04.22 @ 10:55) >  FINDINGS:    Liver: Within normal limits.  Bile ducts: Normal caliber. Common bile duct measures 5 mm.  Gallbladder: Mobile cholelithiasis. No wall thickening, pericholecystic   fluid, or localized tenderness.  Pancreas: Visualized portions are within normal limits.  Right kidney: 10.9 cm. No hydronephrosis.  Ascites: None.  IVC: Visualized portions are within normal limits.    IMPRESSION:    Cholelithiasis, without evidence of acute cholecystitis.    < end of copied text >  < from: CT Abdomen and Pelvis w/ IV Cont (03.04.22 @ 13:17) >  FINDINGS:  LOWER CHEST: Within normal limits.    LIVER: Within normal limits.  BILE DUCTS: Normal caliber.  GALLBLADDER: Cholelithiasis.  SPLEEN: Within normal limits.  PANCREAS: Within normal limits.  ADRENALS: Within normal limits.  KIDNEYS/URETERS: Within normal limits.    BLADDER: Within normal limits.  REPRODUCTIVE ORGANS: Unremarkable uterus. Involuting 1.5 cm left ovarian   cyst. Unremarkable right ovary for patient age.    BOWEL: No bowel obstruction. Appendix is not visualized. No evidence of   inflammation in the pericecal region.. Descending colonic diverticulosis   without diverticulitis. Redundant transverse colon. Mobile cecum located   in the left lower quadrant. Mild fecal burden in the right colon.   Fecalized distal small bowel loops consistent with stasis.  PERITONEUM: No ascites.  VESSELS: Within normal limits.  RETROPERITONEUM/LYMPH NODES: 1.5 x 1.0 cm periportal lymph node (2:35).   Bilateral inguinal lymph nodes measuring up to 1.0 cm maximal diameter.  ABDOMINAL WALL: Within normal limits.  BONES: Within normal limits.    IMPRESSION:  Cholelithiasis.    Prominent periportal and bilateral inguinal lymph nodes.    Constipated right colon. Mobile cecum in the left lower quadrant. Fecal   lies distal small bowel consistent with stasis. Sigmoid diverticulosis   without diverticulitis.    Involuting left ovarian follicle.    < end of copied text >

## 2022-03-04 NOTE — ED PROVIDER NOTE - NSFOLLOWUPCLINICS_GEN_ALL_ED_FT
[facility]  General Surgery  [address] [place] [address] - [address] [place] [address]  Phone: [phone]  Fax:

## 2022-03-04 NOTE — ED ADULT NURSE NOTE - OBJECTIVE STATEMENT
Patient AAOx4, ambulatory at baseline. Complaining of right sided pain. Patient denies chest pain, headache and dizziness. Denies fall. Patient denies n/v/d. Noted to be in acute pain. Breathing even and unlabored. Abdomen soft nontender. Breathing even and unlabored. No pallor or diaphoresis. Patient states the pain began three days ago and keeps getting worse. Went to urgent care and xray was negative. Patient #20g placed to [facility] Labs drawn and sent as per MD order.

## 2022-03-06 LAB
CULTURE RESULTS: SIGNIFICANT CHANGE UP
SPECIMEN SOURCE: SIGNIFICANT CHANGE UP

## 2022-03-07 ENCOUNTER — APPOINTMENT (OUTPATIENT)
Dept: SURGERY | Facility: CLINIC | Age: 37
End: 2022-03-07

## 2022-03-09 ENCOUNTER — NON-APPOINTMENT (OUTPATIENT)
Age: 37
End: 2022-03-09

## 2022-03-25 ENCOUNTER — APPOINTMENT (OUTPATIENT)
Dept: MATERNAL FETAL MEDICINE | Facility: CLINIC | Age: 37
End: 2022-03-25
Payer: MEDICAID

## 2022-03-25 ENCOUNTER — APPOINTMENT (OUTPATIENT)
Dept: ANTEPARTUM | Facility: CLINIC | Age: 37
End: 2022-03-25

## 2022-03-25 VITALS
SYSTOLIC BLOOD PRESSURE: 120 MMHG | WEIGHT: 196 LBS | HEART RATE: 78 BPM | RESPIRATION RATE: 16 BRPM | OXYGEN SATURATION: 99 % | BODY MASS INDEX: 32.65 KG/M2 | HEIGHT: 65 IN | DIASTOLIC BLOOD PRESSURE: 72 MMHG

## 2022-03-25 DIAGNOSIS — N96 RECURRENT PREGNANCY LOSS: ICD-10-CM

## 2022-03-25 DIAGNOSIS — Z82.49 FAMILY HISTORY OF ISCHEMIC HEART DISEASE AND OTHER DISEASES OF THE CIRCULATORY SYSTEM: ICD-10-CM

## 2022-03-25 DIAGNOSIS — Z82.3 FAMILY HISTORY OF STROKE: ICD-10-CM

## 2022-03-25 DIAGNOSIS — Z84.3 FAMILY HISTORY OF CONSANGUINITY: ICD-10-CM

## 2022-03-25 DIAGNOSIS — Z31.69 ENCOUNTER FOR OTHER GENERAL COUNSELING AND ADVICE ON PROCREATION: ICD-10-CM

## 2022-03-25 PROCEDURE — 99214 OFFICE O/P EST MOD 30 MIN: CPT

## 2022-03-26 NOTE — DISCUSSION/SUMMARY
[FreeTextEntry1] : The patient is a 36-year-old -0-5-1 being seen today for preconceptual counseling.\par \par Her obstetrical history is significant for delivery in  of a liveborn male  weighing 7 pounds 5 ounces delivered at term via  section for nonreassuring fetal heart rate tracing.  No problems or complications were noted with that pregnancy.  Subsequently she has had 5 first trimester miscarriages.  4 of the pregnancies were consistent with a blighted ovum.  Pregnancies were never identified and they were lost prior to 6 weeks.  The other first trimester loss was consistent with a missed  again at 6 weeks.  Surgical intervention was not needed for any of those miscarriages.\par \par Recurrent pregnancy loss;\par \par Recurrent pregnancy loss was discussed.\par \par Approximately 70% of first trimester pregnancy losses are due to fetal aneuploidy with the most common chromosomal abnormality being Segura syndrome.  Unfortunately fetal tissue was not analyzed with any of her pregnancy losses.\par \par The patient has had genetic counseling and a report was sent under separate cover.  The couple has had chromosomal analysis which were normal.  The couple are first-degree cousins which may lead to consanguinity.  This may lead to abnormal fetal chromosomes with increased risk for miscarriage.\par \par Abnormal anatomical defects of the uterus may also lead to increased risk for miscarriage.  Endometrial polyps and/or fibroids can impede appropriate implantation and lead to early pregnancy loss.  In addition the patient has had a  section and the possibility of intrauterine scar tissue and/or synechiae were discussed.  A hysterogram was discussed.  The patient states that her periods are 4 to 7 days with the first 2 or 3 days heavy which is not consistent with endometrial cavity scarring.\par \par Antiphospholipid antibody syndrome and acquired thrombophilia abnormalities were discussed.  There is a subset of patients with recurrent pregnancy loss that may have abnormal antibodies which may lead to increased risk of placental clotting which leads to pregnancy loss.  Antiphospholipid antibody and acquired thrombophilia blood work was ordered.\par \par At the completion of this consult the patient understands that we will await results of these blood tests.  Should that be normal hysterogram will be suggested.  In addition a luteal phase defect was discussed and the possibility of an endometrial biopsy was also discussed.\par \par Should all of these blood tests and radiologic studies come back normal the option of in vitro fertilization with PGD was also discussed.\par \par She has a noncontributory family and medical history.  She has had 1  section.  She is allergic to sulfa-based medications and denies alcohol, tobacco or drug use.\par \par I spent a total of 33 minutes reviewing the patient's medical records, blood work and previous consultations counseling and coordinating care.

## 2022-04-01 ENCOUNTER — LABORATORY RESULT (OUTPATIENT)
Age: 37
End: 2022-04-01

## 2022-04-01 LAB
APTT BLD: 37.8 SEC
ESTIMATED AVERAGE GLUCOSE: 94 MG/DL
HBA1C MFR BLD HPLC: 4.9 %
HCYS SERPL-MCNC: 10.8 UMOL/L
INSULIN SERPL-MCNC: 15.4 UU/ML

## 2022-04-04 LAB
APTT 2H P 1:4 NP PPP: 37.7 SEC
APTT 2H P INC PPP: 37.7 SEC
APTT IMM NP/PRE NP PPP: 35.9 SEC
APTT INV RATIO PPP: 37.8 SEC
AT III PPP CHRO-ACNC: 102 %
CONFIRM: 30.7 SEC
DRVVT IMM 1:2 NP PPP: NORMAL
DRVVT SCREEN TO CONFIRM RATIO: 1.04 RATIO
GLUCOSE BS SERPL-MCNC: 98 MG/DL
NPP NORMAL POOLED PLASMA: 33.4 SECS
PROT S AG ACT/NOR PPP IA: 84 %
SCREEN DRVVT: 37.9 SEC
SILICA CLOTTING TIME INTERPRETATION: ABNORMAL
SILICA CLOTTING TIME S/C: 1.32 RATIO

## 2022-04-07 ENCOUNTER — NON-APPOINTMENT (OUTPATIENT)
Age: 37
End: 2022-04-07

## 2022-04-07 LAB
B2 GLYCOPROT1 AB SER QL: POSITIVE
CARDIOLIPIN AB SER IA-ACNC: POSITIVE
DNA PLOIDY SPEC FC-IMP: NORMAL
ENA SS-A AB SER IA-ACNC: <0.2 AL
ENA SS-B AB SER IA-ACNC: <0.2 AL
PTR INTERP: NORMAL

## 2022-04-13 ENCOUNTER — NON-APPOINTMENT (OUTPATIENT)
Age: 37
End: 2022-04-13

## 2022-04-20 ENCOUNTER — NON-APPOINTMENT (OUTPATIENT)
Age: 37
End: 2022-04-20

## 2022-05-10 NOTE — ED ADULT NURSE NOTE - BREATHING, MLM
Received a return call from [place] with [facility] and she reports running the patient's benefit; he has a 50% co-pay and the facility is out of network. She states they are able to accept medicaid pendings on a case by case basis. Advised will need to speak with patient/family. Contacted Lisa with [place] to check if they are in network with patient's insurance. She reports not having Epic access yet and needs demographics faxed over; needed documents faxed to facility. Sourav Valle will run patient's benefits, review the referral and follow-up. Call made to therapy department for patient to be seen stat by PT/OT for updated notes. SW/CM will continue to follow.     Jody Haywood, MSW, LSW [phone] Spontaneous, unlabored and symmetrical

## 2022-07-27 NOTE — ED PROVIDER NOTE - GENITOURINARY [+], MLM
Pediatric Well Child Exam: 6 Years of age    Chief Complaint   Patient presents with   • Well Child       SUBJECTIVE:  Shin Frazier is a 6 year old female who presents for a well child exam.  Patient presents  with Mother.    Preferred method of results communication:     Cell Phone:   Telephone Information:   Mobile [phone]     Okay to leave a message containing results? Yes    CONCERNS RAISED TODAY: none    SLEEP PATTERN:  Hours / Night: .    NAPS: Hours / Day: sometimes.     NUTRITION/GI:  Appetite: Good.  Milk: 1 Percent 1 CUPS/DAY.  Food:   · Eats fruits/vegetables: [x]  YES     []  NO   · Eats meat: [x]  YES     []  NO   Water Supply at Home: bottled.  Stools: 1 / day.    /HOMECARE:   :   []  YES      :  []  YES     []  NO   family member     FAMILY/HOME ENVIRONMENT:  Changes in home/work environment: No  Parents working outside the home: mother  Toxic Exposure:  Tobacco Use: Not Asked         VISION SCREENING:     No exam data present    DEVELOPMENT:  [x]  YES    []  NO      []  UNKNOWN    Can say first/last name?  [x]  YES    []  NO      []  UNKNOWN    Throws ball overhand?  [x]  YES    []  NO      []  UNKNOWN    Copies square/[place]?  [x]  YES    []  NO      []  UNKNOWN    Washes face/hands?  [x]  YES    []  NO      []  UNKNOWN    Brushes teeth w/help?  [x]  YES    []  NO      []  UNKNOWN    Uses fork well?  [x]  YES    []  NO      []  UNKNOWN    Knows most colors?  [x]  YES    []  NO      []  UNKNOWN    Points to letters/numbers when named?  [x]  YES    []  NO      []  UNKNOWN    Counts to 4?  [x]  YES    []  NO      []  UNKNOWN    Talks in full sentences, usually understood?  [x]  YES    []  NO      []  UNKNOWN    Tells stories/talks about day?  [x]  YES    []  NO      []  UNKNOWN    Likes to play hide and seek/similar games?  [x]  YES    []  NO      []  UNKNOWN    Likes to play with others?    OBJECTIVE:  PAST HISTORIES:  Allergies, Medications,  Medical history, Surgical history, Family history reviewed and updated.  IMMUNIZATION STATUS: due  IMMUNIZATION REACTIONS: None  VARICELLA STATUS: non-immune    RECENT HEALTH EVENTS:  Illnesses: None.  Hospitalizations: None.  Injuries or Accidents: None.    REVIEW OF SYSTEMS:    All systems reviewed and negative except as documented in \"Concerns raised\".    PHYSICAL EXAM:      VITAL SIGNS:   Visit Vitals  /59   Pulse 77   Temp 97.9 °F (36.6 °C)   Ht 3' 5\" (1.041 m)   Wt 15.4 kg (34 lb)   SpO2 99%   BMI 14.22 kg/m²    31 %ile (Z= -0.50) based on [facility] [place]Girls, 2-20 Years[place] weight-for-age data using vitals from 7/27/2022.  GENERAL:  Well appearing female child.  Alert and active. Speech at times hard to understand.   SKIN:  Warm, normal turgor.  No cyanosis.  No rash.  HEAD:  Normocephalic, atraumatic.    EYES:  Conjunctivae appear normal, noninjected, nonicteric, positive red reflex.  NOSE:  Appears normal without drainage.  EARS:  Normal external auditory canals. Tympanic membranes are transparent with normal landmarks.  THROAT:  Moist mucous membranes without lesions.  NECK:  Supple, no lymphadenopathy or masses.  HEART:  Regular rate and rythm.  Normal S1, S2.  Murmur heard over apical area radiates to left side. no rubs, gallops.   LUNGS:  Clear to auscultation.  No wheezes, rales, rhonchi.  Normal work effort with breathing.  ABDOMEN:  Bowel sounds present. Soft, nontender.  No hepatomegaly.  No splenomegaly.  No masses.  GENITOURINARY: Octavio stage 1. Normal female genitalia.  Rectum/anus patent.  EXTREMITIES: Normal bilateral range of motion of upper and lower extremities. Peripheral pulses 2/4. Equal femoral pulses.  BACK: Spine straight.   NEUROLOGIC:  Normal muscle tone and symmetrical strength.  Normal deep tendon reflexes.  Symmetrical facial motor function.       Assessment:  6 year old female well child.    Shin Cisneros was seen today for well child.    Diagnoses and all orders for this  visit:    Encounter for routine child health examination with abnormal findings  -     DTAP IPV VACC 4 THRU 6 YRS  -     MMR VACC, SQ  -     SERVICE TO CARDIOLOGY    Murmur  Comments:  Denies strep illness, no fatigue very active.   Orders:  -     SERVICE TO CARDIOLOGY    Other speech disturbance  -     SERVICE TO SPEECH THERAPY      Plan:  1. All parental concerns and questions discussed.  2. Anticipatory guidance provided, handout/s given Safety: Car, bicycle, fire, sharp objects, falls, water  3. Immunizations per orders.  Risks, benefits, and side effects discussed. VIS for Immunizations given.  4. Orders for immunizations given today per the recommended schedule.    Follow up: No follow-ups on file.     vaginal bleeding

## 2022-11-07 ENCOUNTER — APPOINTMENT (OUTPATIENT)
Dept: OBGYN | Facility: CLINIC | Age: 37
End: 2022-11-07

## 2023-04-12 NOTE — ED PROVIDER NOTE - CERVIX
[FreeTextEntry1] : Impression: Minor sprains both ankles stress reaction\par \par I have discussed reducing the amount of stress with regards to her daily practicing hopefully allow her ankles to "rest". mild, small clot noted/BLEEDING

## 2023-04-18 ENCOUNTER — EMERGENCY (EMERGENCY)
Facility: HOSPITAL | Age: 38
LOS: 1 days | Discharge: HOME | End: 2023-04-18
Admitting: EMERGENCY MEDICINE
Payer: MEDICAID

## 2023-04-18 VITALS
TEMPERATURE: 99 F | DIASTOLIC BLOOD PRESSURE: 101 MMHG | HEART RATE: 75 BPM | SYSTOLIC BLOOD PRESSURE: 161 MMHG | OXYGEN SATURATION: 100 % | RESPIRATION RATE: 18 BRPM

## 2023-04-18 PROCEDURE — 99284 EMERGENCY DEPT VISIT MOD MDM: CPT

## 2023-04-18 NOTE — ED ADULT TRIAGE NOTE - MODE OF ARRIVAL
Attempted to call family to  pt x2. Family is not answering phone. Will continue to attempt. Walk in [employer]

## 2023-04-19 VITALS
RESPIRATION RATE: 18 BRPM | DIASTOLIC BLOOD PRESSURE: 70 MMHG | SYSTOLIC BLOOD PRESSURE: 119 MMHG | TEMPERATURE: 98 F | OXYGEN SATURATION: 99 % | HEART RATE: 60 BPM

## 2023-04-19 LAB
ALBUMIN SERPL ELPH-MCNC: 4.3 G/DL — SIGNIFICANT CHANGE UP (ref 3.3–5)
ALP SERPL-CCNC: 58 U/L — SIGNIFICANT CHANGE UP (ref 40–120)
ALT FLD-CCNC: 11 U/L — SIGNIFICANT CHANGE UP (ref 4–33)
ANION GAP SERPL CALC-SCNC: 10 MMOL/L — SIGNIFICANT CHANGE UP (ref 7–14)
APPEARANCE UR: CLEAR — SIGNIFICANT CHANGE UP
AST SERPL-CCNC: 13 U/L — SIGNIFICANT CHANGE UP (ref 4–32)
BACTERIA # UR AUTO: NEGATIVE — SIGNIFICANT CHANGE UP
BASE EXCESS BLDV CALC-SCNC: -1 MMOL/L — SIGNIFICANT CHANGE UP (ref -2–3)
BASOPHILS # BLD AUTO: 0.04 K/UL — SIGNIFICANT CHANGE UP (ref 0–0.2)
BASOPHILS NFR BLD AUTO: 0.4 % — SIGNIFICANT CHANGE UP (ref 0–2)
BILIRUB SERPL-MCNC: 0.4 MG/DL — SIGNIFICANT CHANGE UP (ref 0.2–1.2)
BILIRUB UR-MCNC: NEGATIVE — SIGNIFICANT CHANGE UP
BLOOD GAS VENOUS COMPREHENSIVE RESULT: SIGNIFICANT CHANGE UP
BUN SERPL-MCNC: 10 MG/DL — SIGNIFICANT CHANGE UP (ref 7–23)
CALCIUM SERPL-MCNC: 9.1 MG/DL — SIGNIFICANT CHANGE UP (ref 8.4–10.5)
CHLORIDE BLDV-SCNC: 106 MMOL/L — SIGNIFICANT CHANGE UP (ref 96–108)
CHLORIDE SERPL-SCNC: 105 MMOL/L — SIGNIFICANT CHANGE UP (ref 98–107)
CO2 BLDV-SCNC: 26.2 MMOL/L — HIGH (ref 22–26)
CO2 SERPL-SCNC: 23 MMOL/L — SIGNIFICANT CHANGE UP (ref 22–31)
COLOR SPEC: ABNORMAL
CREAT SERPL-MCNC: 0.56 MG/DL — SIGNIFICANT CHANGE UP (ref 0.5–1.3)
DIFF PNL FLD: ABNORMAL
EGFR: 122 ML/MIN/1.73M2 — SIGNIFICANT CHANGE UP
EOSINOPHIL # BLD AUTO: 0.09 K/UL — SIGNIFICANT CHANGE UP (ref 0–0.5)
EOSINOPHIL NFR BLD AUTO: 0.9 % — SIGNIFICANT CHANGE UP (ref 0–6)
EPI CELLS # UR: 9 /HPF — HIGH (ref 0–5)
GAS PNL BLDV: 139 MMOL/L — SIGNIFICANT CHANGE UP (ref 136–145)
GLUCOSE BLDV-MCNC: 91 MG/DL — SIGNIFICANT CHANGE UP (ref 70–99)
GLUCOSE SERPL-MCNC: 95 MG/DL — SIGNIFICANT CHANGE UP (ref 70–99)
GLUCOSE UR QL: NEGATIVE — SIGNIFICANT CHANGE UP
HCG SERPL-ACNC: <5 MIU/ML — SIGNIFICANT CHANGE UP
HCO3 BLDV-SCNC: 25 MMOL/L — SIGNIFICANT CHANGE UP (ref 22–29)
HCT VFR BLD CALC: 36.1 % — SIGNIFICANT CHANGE UP (ref 34.5–45)
HCT VFR BLDA CALC: 33 % — LOW (ref 34.5–46.5)
HGB BLD CALC-MCNC: 11.1 G/DL — LOW (ref 11.7–16.1)
HGB BLD-MCNC: 10.8 G/DL — LOW (ref 11.5–15.5)
HYALINE CASTS # UR AUTO: 2 /LPF — SIGNIFICANT CHANGE UP (ref 0–7)
IANC: 6.66 K/UL — SIGNIFICANT CHANGE UP (ref 1.8–7.4)
IMM GRANULOCYTES NFR BLD AUTO: 0.8 % — SIGNIFICANT CHANGE UP (ref 0–0.9)
KETONES UR-MCNC: NEGATIVE — SIGNIFICANT CHANGE UP
LACTATE BLDV-MCNC: 0.7 MMOL/L — SIGNIFICANT CHANGE UP (ref 0.5–2)
LEUKOCYTE ESTERASE UR-ACNC: ABNORMAL
LIDOCAIN IGE QN: 22 U/L — SIGNIFICANT CHANGE UP (ref 7–60)
LYMPHOCYTES # BLD AUTO: 2.5 K/UL — SIGNIFICANT CHANGE UP (ref 1–3.3)
LYMPHOCYTES # BLD AUTO: 25.2 % — SIGNIFICANT CHANGE UP (ref 13–44)
MCHC RBC-ENTMCNC: 25.8 PG — LOW (ref 27–34)
MCHC RBC-ENTMCNC: 29.9 GM/DL — LOW (ref 32–36)
MCV RBC AUTO: 86.4 FL — SIGNIFICANT CHANGE UP (ref 80–100)
MONOCYTES # BLD AUTO: 0.57 K/UL — SIGNIFICANT CHANGE UP (ref 0–0.9)
MONOCYTES NFR BLD AUTO: 5.7 % — SIGNIFICANT CHANGE UP (ref 2–14)
NEUTROPHILS # BLD AUTO: 6.66 K/UL — SIGNIFICANT CHANGE UP (ref 1.8–7.4)
NEUTROPHILS NFR BLD AUTO: 67 % — SIGNIFICANT CHANGE UP (ref 43–77)
NITRITE UR-MCNC: NEGATIVE — SIGNIFICANT CHANGE UP
NRBC # BLD: 0 /100 WBCS — SIGNIFICANT CHANGE UP (ref 0–0)
NRBC # FLD: 0 K/UL — SIGNIFICANT CHANGE UP (ref 0–0)
PCO2 BLDV: 45 MMHG — SIGNIFICANT CHANGE UP (ref 39–52)
PH BLDV: 7.35 — SIGNIFICANT CHANGE UP (ref 7.32–7.43)
PH UR: 6.5 — SIGNIFICANT CHANGE UP (ref 5–8)
PLATELET # BLD AUTO: 431 K/UL — HIGH (ref 150–400)
PO2 BLDV: 23 MMHG — LOW (ref 25–45)
POTASSIUM BLDV-SCNC: 3.7 MMOL/L — SIGNIFICANT CHANGE UP (ref 3.5–5.1)
POTASSIUM SERPL-MCNC: 3.9 MMOL/L — SIGNIFICANT CHANGE UP (ref 3.5–5.3)
POTASSIUM SERPL-SCNC: 3.9 MMOL/L — SIGNIFICANT CHANGE UP (ref 3.5–5.3)
PROT SERPL-MCNC: 7.4 G/DL — SIGNIFICANT CHANGE UP (ref 6–8.3)
PROT UR-MCNC: ABNORMAL
RBC # BLD: 4.18 M/UL — SIGNIFICANT CHANGE UP (ref 3.8–5.2)
RBC # FLD: 13.3 % — SIGNIFICANT CHANGE UP (ref 10.3–14.5)
RBC CASTS # UR COMP ASSIST: 638 /HPF — HIGH (ref 0–4)
SAO2 % BLDV: 26.1 % — LOW (ref 67–88)
SODIUM SERPL-SCNC: 138 MMOL/L — SIGNIFICANT CHANGE UP (ref 135–145)
SP GR SPEC: 1.01 — SIGNIFICANT CHANGE UP (ref 1.01–1.05)
UROBILINOGEN FLD QL: SIGNIFICANT CHANGE UP
WBC # BLD: 9.94 K/UL — SIGNIFICANT CHANGE UP (ref 3.8–10.5)
WBC # FLD AUTO: 9.94 K/UL — SIGNIFICANT CHANGE UP (ref 3.8–10.5)
WBC UR QL: 21 /HPF — HIGH (ref 0–5)

## 2023-04-19 PROCEDURE — 74177 CT ABD & PELVIS W/CONTRAST: CPT | Mod: 26,MA

## 2023-04-19 RX ORDER — CEFDINIR 250 MG/5ML
1 POWDER, FOR SUSPENSION ORAL
Qty: 14 | Refills: 0
Start: 2023-04-19 | End: 2023-04-25

## 2023-04-19 RX ORDER — KETOROLAC TROMETHAMINE 30 MG/ML
30 SYRINGE (ML) INJECTION ONCE
Refills: 0 | Status: DISCONTINUED | OUTPATIENT
Start: 2023-04-19 | End: 2023-04-19

## 2023-04-19 RX ORDER — SODIUM CHLORIDE 9 MG/ML
1000 INJECTION INTRAMUSCULAR; INTRAVENOUS; SUBCUTANEOUS ONCE
Refills: 0 | Status: COMPLETED | OUTPATIENT
Start: 2023-04-19 | End: 2023-04-19

## 2023-04-19 RX ORDER — CEFPODOXIME PROXETIL 100 MG
200 TABLET ORAL ONCE
Refills: 0 | Status: COMPLETED | OUTPATIENT
Start: 2023-04-19 | End: 2023-04-19

## 2023-04-19 RX ADMIN — SODIUM CHLORIDE 1000 MILLILITER(S): 9 INJECTION INTRAMUSCULAR; INTRAVENOUS; SUBCUTANEOUS at 03:42

## 2023-04-19 RX ADMIN — Medication 30 MILLIGRAM(S): at 03:58

## 2023-04-19 RX ADMIN — Medication 200 MILLIGRAM(S): at 06:57

## 2023-04-19 RX ADMIN — Medication 30 MILLIGRAM(S): at 04:28

## 2023-04-19 NOTE — ED PROVIDER NOTE - OBJECTIVE STATEMENT
37-year-old female no reported past medical history presents with sudden onset left lower quadrant pain 3 hours prior to arrival.  Patient reports burning with urination.  Pain worse with urination.  Patient also having pain with defecation.  Pain worse with bowel movements.  Patient denies any constipation or diarrhea.  Patient denies fevers chills chest pain shortness of breath nausea or vomiting.  No hematuria.  No back pain.  No vaginal bleeding or discharge.

## 2023-04-19 NOTE — ED ADULT NURSE NOTE - OBJECTIVE STATEMENT
Pt received to intake room 13. Pt A&Ox4 , ambulatory at baseline. Pt c/o pelvic pain since 730pm last night. Pt currently on menses. Denies any n/v/d. Abd nondistended. RR even and unlabored. Denies any hematuria, CP, dizziness, SOB. IV access established with 20G to L AC, labs collected and sent as per MD orders. Pt pending imaging. Safety in place, family at bedside

## 2023-04-19 NOTE — ED PROVIDER NOTE - PATIENT PORTAL LINK FT
You can access the FollowMyHealth Patient Portal offered by [facility] by registering at the following website: [email or web address]. By joining [employer]’s FollowMyHealth portal, you will also be able to view your health information using other applications (apps) compatible with our system.

## 2023-04-19 NOTE — ED PROVIDER NOTE - CLINICAL SUMMARY MEDICAL DECISION MAKING FREE TEXT BOX
37-year-old female no reported past medical history presents with sudden onset left lower quadrant pain 3 hours prior to arrival.  Patient reports burning with urination.  Pain worse with urination.  Patient also having pain with defecation.  Pain worse with bowel movements.  Patient denies any constipation or diarrhea.  Patient denies fevers chills chest pain shortness of breath nausea or vomiting.  No hematuria.  No back pain.  No vaginal bleeding or discharge.    plan  - labs  - ua/cx  - ctap  - ivf  - pain control  - reassess

## 2023-04-19 NOTE — ED PROVIDER NOTE - PROGRESS NOTE DETAILS
pain improved. abd soft non tender. CTAP without acute pathology. UA shows likely UTI. Will treat. Advise to return to ED if any worsening symptoms. VSS.

## 2023-04-20 LAB
CULTURE RESULTS: SIGNIFICANT CHANGE UP
SPECIMEN SOURCE: SIGNIFICANT CHANGE UP

## 2023-04-21 NOTE — ED POST DISCHARGE NOTE - RESULT SUMMARY
UCX: Culture grew 3 or more types of organisms which indicate collection contamination; consider recollection only if clinically indicated.  Pt discharged on cefdinir.

## 2024-02-09 ENCOUNTER — LABORATORY RESULT (OUTPATIENT)
Age: 39
End: 2024-02-09

## 2024-02-09 ENCOUNTER — APPOINTMENT (OUTPATIENT)
Dept: OBGYN | Facility: CLINIC | Age: 39
End: 2024-02-09
Payer: MEDICAID

## 2024-02-09 VITALS
HEART RATE: 77 BPM | BODY MASS INDEX: 32.15 KG/M2 | SYSTOLIC BLOOD PRESSURE: 119 MMHG | HEIGHT: 65 IN | WEIGHT: 193 LBS | DIASTOLIC BLOOD PRESSURE: 82 MMHG

## 2024-02-09 PROCEDURE — 99203 OFFICE O/P NEW LOW 30 MIN: CPT | Mod: TH

## 2024-02-11 ENCOUNTER — NON-APPOINTMENT (OUTPATIENT)
Age: 39
End: 2024-02-11

## 2024-02-12 LAB
CARDIOLIPIN AB SER IA-ACNC: POSITIVE
CARDIOLIPIN IGM SER-MCNC: 8.5 GPL
PROLACTIN SERPL-MCNC: 16.1 NG/ML
TSH SERPL-ACNC: 0.97 UIU/ML

## 2024-02-14 LAB — CARDIOLIPIN IGM SER-MCNC: 17.5 MPL

## 2024-02-14 NOTE — HISTORY OF PRESENT ILLNESS
[FreeTextEntry1] : 39 y/o presents to discuss fertility.  LMP Waqas, monthly periods, last 6-7 days.  Sexually active, trying to conceive   Pt reports last positive pregnancy test was 2022, same partner for all pregnancies  Pt has not been tracking ovulation or having timed intercourse.   Of note, pt and partner are cousins   Pt had extensive workup for this issue, seen by MFM for pre-conception counseling, records scanned in. Sonogram WNL, Karyotype or pt and partner WNL, labs show slightly elevated anti-cardiolipin   OBHx:  -2015 pLTCS for fetal distress, 7#5oz, c/b GDMA1 and need for postpartum blood transfusion -SAB x5, first trimester  GynHx: denies  PMHx: denies PSHx: LTCS Meds: denies  All: sulfa (perioral edema), PPI (perioral edema) SH: neg x3

## 2024-02-14 NOTE — PLAN
[FreeTextEntry1] : 39y/o presents to discuss fertility, hx of 5 first trimester SABs. RPL workup at outside facility, WNL   #RPL -Pt now without positive pregnancy since 2022 -Discussed ovulation prediction and timed intercourse with visual aid, will try for the next 3 months  -Will repeat APLS antibodies today given slightly elevated anti-cardiolipin -Discussed prophylactic Lovenox during next pregnancy as pt meets the crtieria for APLS by > 3 first trimester losses  -Discussed referral to [facility] if unable to conceive   RTO 3 months  daysi BUTLER

## 2024-02-26 ENCOUNTER — APPOINTMENT (OUTPATIENT)
Dept: OBGYN | Facility: CLINIC | Age: 39
End: 2024-02-26
Payer: MEDICAID

## 2024-02-26 PROCEDURE — 99213 OFFICE O/P EST LOW 20 MIN: CPT | Mod: TH

## 2024-02-26 NOTE — HISTORY OF PRESENT ILLNESS
[FreeTextEntry1] : This visit was provided via telehealth using real-time 2-way audio visual technology. The patient, RENETTA ACE  , was located at home, [address] [place] [address] , at the time of the visit.  The provider, BALBIR BARR , was located at the medical office located in [facility] at the time of the visit. The patient RENETTA ACE  and Provider participated in the telehealth encounter.  Verbal consent given on 02/26/2024  by the patient, self.   Pt presents with positive pregnancy at home, LMP 1/26 Pt with hx of recurrent pregnancy loss, here to discuss the use of Lovenox, as pt meets criteria for APLS with > 3 first trimester abortions.  Pt feels well, no complaints at this time. Pt has never used Lovenox before, but has used insulin during her prior pregnancy and it familiar with self-injection

## 2024-02-26 NOTE — PLAN
[FreeTextEntry1] : 37y/o  LMP  presents to discuss positive pregnancy test in the setting of APLS -Will start prophylactic Lovenox 40mg daily, rx sent to pharmacy -Instructions reviewed on proper administration -Start PNV with folic acid and DHA -Pt scheduled for NPN on 3/15  daysi BUTLER

## 2024-03-03 ENCOUNTER — EMERGENCY (EMERGENCY)
Facility: HOSPITAL | Age: 39
LOS: 1 days | Discharge: HOME | End: 2024-03-03
Attending: EMERGENCY MEDICINE | Admitting: EMERGENCY MEDICINE
Payer: MEDICAID

## 2024-03-03 VITALS
OXYGEN SATURATION: 100 % | RESPIRATION RATE: 16 BRPM | HEART RATE: 68 BPM | SYSTOLIC BLOOD PRESSURE: 116 MMHG | DIASTOLIC BLOOD PRESSURE: 78 MMHG | TEMPERATURE: 98 F

## 2024-03-03 VITALS
DIASTOLIC BLOOD PRESSURE: 85 MMHG | SYSTOLIC BLOOD PRESSURE: 128 MMHG | HEART RATE: 85 BPM | TEMPERATURE: 98 F | OXYGEN SATURATION: 98 % | RESPIRATION RATE: 16 BRPM

## 2024-03-03 LAB
ALBUMIN SERPL ELPH-MCNC: 4 G/DL — SIGNIFICANT CHANGE UP (ref 3.3–5)
ALP SERPL-CCNC: 58 U/L — SIGNIFICANT CHANGE UP (ref 40–120)
ALT FLD-CCNC: 18 U/L — SIGNIFICANT CHANGE UP (ref 4–33)
ANION GAP SERPL CALC-SCNC: 10 MMOL/L — SIGNIFICANT CHANGE UP (ref 7–14)
AST SERPL-CCNC: 20 U/L — SIGNIFICANT CHANGE UP (ref 4–32)
BASOPHILS # BLD AUTO: 0.04 K/UL — SIGNIFICANT CHANGE UP (ref 0–0.2)
BASOPHILS NFR BLD AUTO: 0.5 % — SIGNIFICANT CHANGE UP (ref 0–2)
BILIRUB SERPL-MCNC: 0.3 MG/DL — SIGNIFICANT CHANGE UP (ref 0.2–1.2)
BLD GP AB SCN SERPL QL: NEGATIVE — SIGNIFICANT CHANGE UP
BUN SERPL-MCNC: 9 MG/DL — SIGNIFICANT CHANGE UP (ref 7–23)
CALCIUM SERPL-MCNC: 9.6 MG/DL — SIGNIFICANT CHANGE UP (ref 8.4–10.5)
CHLORIDE SERPL-SCNC: 105 MMOL/L — SIGNIFICANT CHANGE UP (ref 98–107)
CO2 SERPL-SCNC: 23 MMOL/L — SIGNIFICANT CHANGE UP (ref 22–31)
CREAT SERPL-MCNC: 0.44 MG/DL — LOW (ref 0.5–1.3)
EGFR: 128 ML/MIN/1.73M2 — SIGNIFICANT CHANGE UP
EOSINOPHIL # BLD AUTO: 0.06 K/UL — SIGNIFICANT CHANGE UP (ref 0–0.5)
EOSINOPHIL NFR BLD AUTO: 0.7 % — SIGNIFICANT CHANGE UP (ref 0–6)
GLUCOSE SERPL-MCNC: 97 MG/DL — SIGNIFICANT CHANGE UP (ref 70–99)
HCG SERPL-ACNC: 155.2 MIU/ML — SIGNIFICANT CHANGE UP
HCT VFR BLD CALC: 37.1 % — SIGNIFICANT CHANGE UP (ref 34.5–45)
HGB BLD-MCNC: 11.3 G/DL — LOW (ref 11.5–15.5)
IANC: 5.53 K/UL — SIGNIFICANT CHANGE UP (ref 1.8–7.4)
IMM GRANULOCYTES NFR BLD AUTO: 1.5 % — HIGH (ref 0–0.9)
LYMPHOCYTES # BLD AUTO: 1.98 K/UL — SIGNIFICANT CHANGE UP (ref 1–3.3)
LYMPHOCYTES # BLD AUTO: 24.1 % — SIGNIFICANT CHANGE UP (ref 13–44)
MCHC RBC-ENTMCNC: 26.8 PG — LOW (ref 27–34)
MCHC RBC-ENTMCNC: 30.5 GM/DL — LOW (ref 32–36)
MCV RBC AUTO: 88.1 FL — SIGNIFICANT CHANGE UP (ref 80–100)
MONOCYTES # BLD AUTO: 0.49 K/UL — SIGNIFICANT CHANGE UP (ref 0–0.9)
MONOCYTES NFR BLD AUTO: 6 % — SIGNIFICANT CHANGE UP (ref 2–14)
NEUTROPHILS # BLD AUTO: 5.53 K/UL — SIGNIFICANT CHANGE UP (ref 1.8–7.4)
NEUTROPHILS NFR BLD AUTO: 67.2 % — SIGNIFICANT CHANGE UP (ref 43–77)
NRBC # BLD: 0 /100 WBCS — SIGNIFICANT CHANGE UP (ref 0–0)
NRBC # FLD: 0 K/UL — SIGNIFICANT CHANGE UP (ref 0–0)
PLATELET # BLD AUTO: 452 K/UL — HIGH (ref 150–400)
POTASSIUM SERPL-MCNC: 4.2 MMOL/L — SIGNIFICANT CHANGE UP (ref 3.5–5.3)
POTASSIUM SERPL-SCNC: 4.2 MMOL/L — SIGNIFICANT CHANGE UP (ref 3.5–5.3)
PROT SERPL-MCNC: 7.7 G/DL — SIGNIFICANT CHANGE UP (ref 6–8.3)
RBC # BLD: 4.21 M/UL — SIGNIFICANT CHANGE UP (ref 3.8–5.2)
RBC # FLD: 13.4 % — SIGNIFICANT CHANGE UP (ref 10.3–14.5)
RH IG SCN BLD-IMP: POSITIVE — SIGNIFICANT CHANGE UP
SODIUM SERPL-SCNC: 138 MMOL/L — SIGNIFICANT CHANGE UP (ref 135–145)
WBC # BLD: 8.22 K/UL — SIGNIFICANT CHANGE UP (ref 3.8–10.5)
WBC # FLD AUTO: 8.22 K/UL — SIGNIFICANT CHANGE UP (ref 3.8–10.5)

## 2024-03-03 PROCEDURE — 99285 EMERGENCY DEPT VISIT HI MDM: CPT

## 2024-03-03 PROCEDURE — 76830 TRANSVAGINAL US NON-OB: CPT | Mod: 26

## 2024-03-03 NOTE — ED ADULT TRIAGE NOTE - CHIEF COMPLAINT QUOTE
states' I am pregnant and I have vaginal spotting since today " also states I am taking Lovenox 40Mcg daily since days given by [facility].   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

## 2024-03-03 NOTE — CONSULT NOTE ADULT - ASSESSMENT
Patient is a 37yo  at 5w1d by LMP  presenting for several episodes of blood on toilet paper when wiping today in the setting of positive pregnancy test. Patient with OB hx significant for recurrent pregnancy loss. On arrival to ED, patient's vital signs wnl, labs significant for bHCG 155, TVUS showing no IUP or suspicious adnexal masses, EMS 8mm. Differential diagnosis at this time includes early IUP vs SAB vs ectopic pregnancy. No clinical or radiologic findings concerning for ruptured ectopic pregnancy.    Recs:   - Given patient has pregnancy of unknown location, no elevated concern for ectopic pregnancy, patient counseled on expectant management with serial bHCG and ultrasound as needed  - Patient is to call Dr. Ha' office tomorrow, 3/4, to likely request moving up appointment for either later this week or early next week  - Patient is Rh positive, no rhogam indicated  - Strict return precautions discussed, patient to return to ED for heavy bleeding (soaking >1 pad in 1 hr), severe pain, dizziness/lightheadedness, fevers, chills, CP/SOB    D/w covering attending, Dr. Parminder Marcnao PGY2

## 2024-03-03 NOTE — ED PROVIDER NOTE - NSFOLLOWUPINSTRUCTIONS_ED_ALL_ED_FT
Follow up with your primary doctor and OBGYN. If you do not have an OBGYN call  to schedule an appointment. Advance activity as tolerated.  Continue all previously prescribed medications as directed.  Follow up with your primary care physician in 48-72 hours- bring copies of your results.  Return to the ER for worsening or persistent symptoms, and/or ANY NEW OR CONCERNING SYMPTOMS. This includes but is not limited to increasing vaginal bleeding, lightheadedness, if you feel like you will pass out or for any other changes that concern you. Bring this packet to your doctors, it includes copies of your results. If you have issues obtaining follow up, please call: [phone] to obtain a doctor or specialist who takes your insurance in your area.  You may call [phone] to make an appointment with the internal medicine clinic. Please follow up with Dr Ha office end of next week or beginning of the March 11th week.       Follow up with your primary doctor and OBGYN. If you do not have an OBGYN call  to schedule an appointment. Advance activity as tolerated.  Continue all previously prescribed medications as directed.  Follow up with your primary care physician in 48-72 hours- bring copies of your results.  Return to the ER for worsening or persistent symptoms, and/or ANY NEW OR CONCERNING SYMPTOMS. This includes but is not limited to increasing vaginal bleeding, lightheadedness, if you feel like you will pass out or for any other changes that concern you. Bring this packet to your doctors, it includes copies of your results. If you have issues obtaining follow up, please call: [phone] to obtain a doctor or specialist who takes your insurance in your area.  You may call [phone] to make an appointment with the internal medicine clinic.

## 2024-03-03 NOTE — CONSULT NOTE ADULT - SUBJECTIVE AND OBJECTIVE BOX
RENETTA ACE  38y  Female [id number]    HPI: Patient is a 39yo  at 5w1d by LMP  presenting for several episodes of blood on toilet paper when wiping today in the setting of positive pregnancy test. Has not been wearing a pad because the bleeding is so scant. Reports mild lower pelvic cramping, otherwise no severe abd pain, nausea, vomiting, lightheadedness, dizziness. Pt established GYN care with Dr. Vance Ha recently on , was counseled about OPKs and timed intercourse, also had repeated APLS ab testing performed. On  patient had a telehealth appt with Dr. Ha due to positive home pregnancy test. She was started on prophylactic Lovenox injections due to presumed APLS and a new OB appt was scheduled for 3/15.    Name of GYN Physician:   OBHx: 2015 pLTCS for fetal distress, 7#5oz, c/b GDMA1 and need for postpartum blood transfusion  -SAB x5, first trimester  GynHx: Denies fibroids, cysts, endometriosis, STI's, Abnormal pap smears   PMH: denies  PSH: pLTCS  Meds: PNV, Folic acid  Allx: sulfa (perioral edema), PPI (perioral edema)  Social History:  Denies smoking use, drug use, alcohol use    Vital Signs Last 24 Hrs  T(C): 36.8 (03 Mar 2024 15:51), Max: 36.8 (03 Mar 2024 15:51)  T(F): 98.2 (03 Mar 2024 15:51), Max: 98.2 (03 Mar 2024 15:51)  HR: 68 (03 Mar 2024 15:51) (68 - 85)  BP: 116/78 (03 Mar 2024 15:51) (116/78 - 128/85)  RR: 16 (03 Mar 2024 15:51) (16 - 16)  SpO2: 100% (03 Mar 2024 15:51) (98% - 100%)    Parameters below as of 03 Mar 2024 15:51  Patient On (Oxygen Delivery Method): room air    Physical Exam:   General: sitting comfortably in bed, NAD   HEENT: neck supple, full ROM  CV: extremities well perfused  Lungs: normal respiratory effort on room air  Abd: Soft, non-tender, non-distended  : deferred  Ext: non-tender b/l, no edema     LABS:                        11.3   8.22  )-----------( 452      ( 03 Mar 2024 13:36 )             37.1         138  |  105  |  9   ----------------------------<  97  4.2   |  23  |  0.44<L>    Ca    9.6      03 Mar 2024 13:36    TPro  7.7  /  Alb  4.0  /  TBili  0.3  /  DBili  x   /  AST  20  /  ALT  18  /  AlkPhos  58      I&O's Detail    Urinalysis Basic - ( 03 Mar 2024 13:36 )    Color: x / Appearance: x / SG: x / pH: x  Gluc: 97 mg/dL / Ketone: x  / Bili: x / Urobili: x   Blood: x / Protein: x / Nitrite: x   Leuk Esterase: x / RBC: x / WBC x   Sq Epi: x / Non Sq Epi: x / Bacteria: x    RADIOLOGY & ADDITIONAL STUDIES:  < from: US Transvaginal (24 @ 15:02) >  ACC: [id number] EXAM:  US TRANSVAGINAL   ORDERED BY: SHIRLEY WASHINGTON     PROCEDURE DATE:  2024      INTERPRETATION:  CLINICAL INFORMATION: Vaginal bleeding. Beta-hCG 155.2   (3/3/2024)    LMP: 2024    Estimated Gestational Age by LMP: 5 weeks and 1 day    COMPARISON: None available.    TECHNIQUE:  Endovaginal pelvic sonogram only. Color and Spectral Doppler was   performed.    FINDINGS:  Uterus: 8.8 cm x 5.6 cm x 6.6 cm. No gestational sac is visualized.  Endometrium: 9 mm. Within normal limits.    Right ovary: 2.8 cm x 3.9 cm x 2.4 cm. Within normal limits. Normal   arterial and venous waveforms.  Left ovary: 3.4 cm x 2.4 cm x 2.3 cm. Within normal limits. Normal   arterial and venous waveforms.    Fluid: None.    IMPRESSION:  Neither an intrauterine nor extrauterine gestation is identified. This   represents a pregnancy of indeterminate location. Differential diagnosis   includes early normal IUP, pregnancy failure or ectopic pregnancy. Serial   hCG and ultrasound are recommended to determine the significance of these   findings.    --- End of Report ---    MARISOL DOMÍNGUEZ DO; Resident Radiologist  This document has been electronically signed.  CLINT ALEGRIA MD; Attending Radiologist  This document has been electronically signed. Mar  3 2024  4:26PM    < end of copied text >

## 2024-03-03 NOTE — ED ADULT NURSE NOTE - CHIEF COMPLAINT QUOTE
states' I am pregnant and I have vaginal spotting since today " also states I am taking Lovenox 40Mcg daily since days given by [facility].   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

## 2024-03-03 NOTE — ED PROVIDER NOTE - OBJECTIVE STATEMENT
This is a 38 yr old F, pmh gestational dm, multiple miscarriages with c/o vaginal bleeding and spotting started today in the am. Reports seen by Dr Angulo at the [facility] ( [phone]) as a high pregnancy patient. LMP . . She started on Lovenox 40 on . C/o intermittent back pain and abd cramps. Denies fever, chills, sob, headache, dizziness. Denies hx of sti, uterine fibroids or cysts. This is a 38 yr old F, pmh gestational dm, multiple miscarriages with c/o vaginal bleeding and spotting started today in the am. Reports seen by Dr Angulo at the [facility] ( [phone]) as a high pregnancy patient. LMP . . She started on Lovenox 40 on . C/o intermittent back pain and abd cramps. Denies fever, chills, sob, headache, dizziness. Denies hx of sti, uterine fibroids or cysts.    Attendin-year-old female presents with vaginal bleeding.  Patient is approximately 5 weeks pregnant by dates.  She is on Lovenox prophylactically to help her conceive.  She has not had any imaging for this pregnancy so far. This is a 38 yr old F, pmh gestational dm, multiple miscarriages with c/o vaginal bleeding and spotting started today in the am. Reports seen by Dr Ha at the [facility] ( [phone]) as a high pregnancy patient. LMP . . She started on Lovenox 40 on . C/o intermittent back pain and abd cramps. Denies fever, chills, sob, headache, dizziness. Denies hx of sti, uterine fibroids or cysts.    Attendin-year-old female presents with vaginal bleeding.  Patient is approximately 5 weeks pregnant by dates.  She is on Lovenox prophylactically to help her conceive.  She has not had any imaging for this pregnancy so far.

## 2024-03-03 NOTE — ED PROVIDER NOTE - CLINICAL SUMMARY MEDICAL DECISION MAKING FREE TEXT BOX
This is a 38 yr old F, pmh gestational dm, multiple miscarriages with c/o vaginal bleeding and spotting started today in the am. Reports seen by Dr Angulo at the [facility] ( [phone]) as a high pregnancy patient. LMP . . She started on Lovenox 40 on . C/o intermittent back pain and abd cramps. Denies fever, chills, sob, headache, dizziness. Denies hx of sti, uterine fibroids or cysts.  No official confirmed pregnancy via tvus yet.  labs, hcg, ua, tvus This is a 38 yr old F, pmh gestational dm, multiple miscarriages with c/o vaginal bleeding and spotting started today in the am. Reports seen by Dr Ha at the [facility] ( [phone]) as a high pregnancy patient. LMP . . She started on Lovenox 40 on . C/o intermittent back pain and abd cramps. Denies fever, chills, sob, headache, dizziness. Denies hx of sti, uterine fibroids or cysts.  No official confirmed pregnancy via tvus yet.  labs, hcg, ua, tvus

## 2024-03-03 NOTE — ED ADULT NURSE NOTE - OBJECTIVE STATEMENT
Patient presented to ED with a c/o of vaginal bleeding with (+) pregnancy test. Patient states that she has scant bleeding and is taking lovenox with a high risk pregnancy 2/2 five miscarriages in the past. Patient is A/O x 4, NAD, RR even and unlabored, denies weakness, dizziness, fatigue. Safety maintained, plan of care reviewed, will continue to monitor patient

## 2024-03-03 NOTE — ED PROVIDER NOTE - PROGRESS NOTE DETAILS
NP Vishal- hcg 150, tvus inconclusive, obgyn recommendation follow up with DR Ha end of next week or beginning of the week of the 11th.   PT understands recommendation, strict return precautions provided.

## 2024-03-03 NOTE — ED PROVIDER NOTE - PATIENT PORTAL LINK FT
You can access the FollowMyHealth Patient Portal offered by [facility] by registering at the following website: [email or web address]. By joining [email or web address]’s FollowMyHealth portal, you will also be able to view your health information using other applications (apps) compatible with our system.

## 2024-03-03 NOTE — ED ADULT NURSE NOTE - NSFALLUNIVINTERV_ED_ALL_ED
Bed/Stretcher in lowest position, wheels locked, appropriate side rails in place/Call bell, personal items and telephone in reach/Instruct patient to call for assistance before getting out of bed/chair/stretcher/Non-slip footwear applied when patient is off stretcher/[place] to call system/Physically safe environment - no spills, clutter or unnecessary equipment/Purposeful proactive rounding/Room/bathroom lighting operational, light cord in reach

## 2024-03-04 PROBLEM — O03.9 COMPLETE OR UNSPECIFIED SPONTANEOUS ABORTION WITHOUT COMPLICATION: Chronic | Status: ACTIVE | Noted: 2024-03-03

## 2024-03-05 ENCOUNTER — ASOB RESULT (OUTPATIENT)
Age: 39
End: 2024-03-05

## 2024-03-05 ENCOUNTER — APPOINTMENT (OUTPATIENT)
Dept: OBGYN | Facility: CLINIC | Age: 39
End: 2024-03-05
Payer: MEDICAID

## 2024-03-05 VITALS
DIASTOLIC BLOOD PRESSURE: 75 MMHG | HEART RATE: 88 BPM | HEIGHT: 65 IN | BODY MASS INDEX: 31.99 KG/M2 | SYSTOLIC BLOOD PRESSURE: 119 MMHG | WEIGHT: 192 LBS

## 2024-03-05 PROCEDURE — 99213 OFFICE O/P EST LOW 20 MIN: CPT | Mod: TH

## 2024-03-05 PROCEDURE — 76817 TRANSVAGINAL US OBSTETRIC: CPT

## 2024-03-06 LAB
ABO + RH PNL BLD: NORMAL
BACTERIA UR CULT: NORMAL
HCG SERPL-MCNC: 225 MIU/ML
HCT VFR BLD CALC: 40.8 %
HGB BLD-MCNC: 12 G/DL
MCHC RBC-ENTMCNC: 26.8 PG
MCHC RBC-ENTMCNC: 29.4 GM/DL
MCV RBC AUTO: 91.1 FL
PLATELET # BLD AUTO: 488 K/UL
RBC # BLD: 4.48 M/UL
RBC # FLD: 13.5 %
WBC # FLD AUTO: 9.49 K/UL

## 2024-03-06 NOTE — HISTORY OF PRESENT ILLNESS
[FreeTextEntry1] : 39y/o LMP 1/26 at 5w4d by dates presents for ED followup.  Pt reports she experienced spotting on 3/3 and presented to the ED where PUL was diagnosed. bhcg 150 at that time Pt continues to report small spotting, denies cramping.  Pt is on Lovenox daily for RPL

## 2024-03-06 NOTE — PLAN
[FreeTextEntry1] : 37y/o presents for ED followup for PUL. Pt with small vaginal spotting. bhcg 150 in ED  #PUL -Pt counseled on diagnosis  -Ectopic precautions discussed, visual aid used to assist in conversation  -bhcg today, repeat in 48 hours   daysi BUTLER

## 2024-03-06 NOTE — PHYSICAL EXAM
[Appropriately responsive] : appropriately responsive [Alert] : alert [No Acute Distress] : no acute distress [No Lymphadenopathy] : no lymphadenopathy [Regular Rate Rhythm] : regular rate rhythm [No Murmurs] : no murmurs [Clear to Auscultation B/L] : clear to auscultation bilaterally [Soft] : soft [Non-distended] : non-distended [Non-tender] : non-tender [No HSM] : No HSM [No Lesions] : no lesions [No Mass] : no mass [Oriented x3] : oriented x3

## 2024-03-07 ENCOUNTER — APPOINTMENT (OUTPATIENT)
Dept: OBGYN | Facility: CLINIC | Age: 39
End: 2024-03-07

## 2024-03-08 LAB — HCG SERPL-MCNC: 260 MIU/ML

## 2024-03-09 ENCOUNTER — EMERGENCY (EMERGENCY)
Facility: HOSPITAL | Age: 39
LOS: 1 days | Discharge: HOME | End: 2024-03-09
Admitting: STUDENT IN AN ORGANIZED HEALTH CARE EDUCATION/TRAINING PROGRAM
Payer: MEDICAID

## 2024-03-09 VITALS
OXYGEN SATURATION: 96 % | DIASTOLIC BLOOD PRESSURE: 79 MMHG | WEIGHT: 192.02 LBS | RESPIRATION RATE: 17 BRPM | HEART RATE: 65 BPM | TEMPERATURE: 98 F | SYSTOLIC BLOOD PRESSURE: 123 MMHG

## 2024-03-09 VITALS
SYSTOLIC BLOOD PRESSURE: 123 MMHG | DIASTOLIC BLOOD PRESSURE: 80 MMHG | TEMPERATURE: 99 F | OXYGEN SATURATION: 98 % | HEART RATE: 79 BPM | RESPIRATION RATE: 17 BRPM

## 2024-03-09 LAB
ALBUMIN SERPL ELPH-MCNC: 4 G/DL — SIGNIFICANT CHANGE UP (ref 3.3–5)
ALP SERPL-CCNC: 55 U/L — SIGNIFICANT CHANGE UP (ref 40–120)
ALT FLD-CCNC: 21 U/L — SIGNIFICANT CHANGE UP (ref 4–33)
ANION GAP SERPL CALC-SCNC: 11 MMOL/L — SIGNIFICANT CHANGE UP (ref 7–14)
APTT BLD: 38.1 SEC — HIGH (ref 24.5–35.6)
AST SERPL-CCNC: 18 U/L — SIGNIFICANT CHANGE UP (ref 4–32)
BASOPHILS # BLD AUTO: 0.03 K/UL — SIGNIFICANT CHANGE UP (ref 0–0.2)
BASOPHILS NFR BLD AUTO: 0.4 % — SIGNIFICANT CHANGE UP (ref 0–2)
BILIRUB SERPL-MCNC: 0.4 MG/DL — SIGNIFICANT CHANGE UP (ref 0.2–1.2)
BLD GP AB SCN SERPL QL: NEGATIVE — SIGNIFICANT CHANGE UP
BUN SERPL-MCNC: 10 MG/DL — SIGNIFICANT CHANGE UP (ref 7–23)
CALCIUM SERPL-MCNC: 9 MG/DL — SIGNIFICANT CHANGE UP (ref 8.4–10.5)
CHLORIDE SERPL-SCNC: 106 MMOL/L — SIGNIFICANT CHANGE UP (ref 98–107)
CO2 SERPL-SCNC: 24 MMOL/L — SIGNIFICANT CHANGE UP (ref 22–31)
CREAT SERPL-MCNC: 0.5 MG/DL — SIGNIFICANT CHANGE UP (ref 0.5–1.3)
EGFR: 125 ML/MIN/1.73M2 — SIGNIFICANT CHANGE UP
EOSINOPHIL # BLD AUTO: 0.07 K/UL — SIGNIFICANT CHANGE UP (ref 0–0.5)
EOSINOPHIL NFR BLD AUTO: 1 % — SIGNIFICANT CHANGE UP (ref 0–6)
GLUCOSE SERPL-MCNC: 98 MG/DL — SIGNIFICANT CHANGE UP (ref 70–99)
HCG SERPL-ACNC: 296.8 MIU/ML — SIGNIFICANT CHANGE UP
HCT VFR BLD CALC: 35.4 % — SIGNIFICANT CHANGE UP (ref 34.5–45)
HGB BLD-MCNC: 10.8 G/DL — LOW (ref 11.5–15.5)
IANC: 4.43 K/UL — SIGNIFICANT CHANGE UP (ref 1.8–7.4)
IMM GRANULOCYTES NFR BLD AUTO: 1.8 % — HIGH (ref 0–0.9)
INR BLD: 1.06 RATIO — SIGNIFICANT CHANGE UP (ref 0.85–1.18)
LYMPHOCYTES # BLD AUTO: 2 K/UL — SIGNIFICANT CHANGE UP (ref 1–3.3)
LYMPHOCYTES # BLD AUTO: 28.1 % — SIGNIFICANT CHANGE UP (ref 13–44)
MCHC RBC-ENTMCNC: 27 PG — SIGNIFICANT CHANGE UP (ref 27–34)
MCHC RBC-ENTMCNC: 30.5 GM/DL — LOW (ref 32–36)
MCV RBC AUTO: 88.5 FL — SIGNIFICANT CHANGE UP (ref 80–100)
MONOCYTES # BLD AUTO: 0.46 K/UL — SIGNIFICANT CHANGE UP (ref 0–0.9)
MONOCYTES NFR BLD AUTO: 6.5 % — SIGNIFICANT CHANGE UP (ref 2–14)
NEUTROPHILS # BLD AUTO: 4.43 K/UL — SIGNIFICANT CHANGE UP (ref 1.8–7.4)
NEUTROPHILS NFR BLD AUTO: 62.2 % — SIGNIFICANT CHANGE UP (ref 43–77)
NRBC # BLD: 0 /100 WBCS — SIGNIFICANT CHANGE UP (ref 0–0)
NRBC # FLD: 0 K/UL — SIGNIFICANT CHANGE UP (ref 0–0)
PLATELET # BLD AUTO: 375 K/UL — SIGNIFICANT CHANGE UP (ref 150–400)
POTASSIUM SERPL-MCNC: 3.9 MMOL/L — SIGNIFICANT CHANGE UP (ref 3.5–5.3)
POTASSIUM SERPL-SCNC: 3.9 MMOL/L — SIGNIFICANT CHANGE UP (ref 3.5–5.3)
PROT SERPL-MCNC: 7.4 G/DL — SIGNIFICANT CHANGE UP (ref 6–8.3)
PROTHROM AB SERPL-ACNC: 11.9 SEC — SIGNIFICANT CHANGE UP (ref 9.5–13)
RBC # BLD: 4 M/UL — SIGNIFICANT CHANGE UP (ref 3.8–5.2)
RBC # FLD: 13.5 % — SIGNIFICANT CHANGE UP (ref 10.3–14.5)
RH IG SCN BLD-IMP: POSITIVE — SIGNIFICANT CHANGE UP
SODIUM SERPL-SCNC: 141 MMOL/L — SIGNIFICANT CHANGE UP (ref 135–145)
WBC # BLD: 7.12 K/UL — SIGNIFICANT CHANGE UP (ref 3.8–10.5)
WBC # FLD AUTO: 7.12 K/UL — SIGNIFICANT CHANGE UP (ref 3.8–10.5)

## 2024-03-09 PROCEDURE — 99285 EMERGENCY DEPT VISIT HI MDM: CPT

## 2024-03-09 PROCEDURE — 76830 TRANSVAGINAL US NON-OB: CPT | Mod: 26

## 2024-03-09 PROCEDURE — 99283 EMERGENCY DEPT VISIT LOW MDM: CPT | Mod: GC

## 2024-03-09 NOTE — CONSULT NOTE ADULT - ASSESSMENT
39yo  LMP  @6wk presenting for follow up of PUL. Patient with stable vital signs and benign abdominal exam. (3/3)->225(3/5)->260(3/7)      ***INCOMPLETE NOTE, RECS PENDING LABS/TVUS***  37yo  LMP  @6wk presenting for follow up of PUL. Patient with stable vital signs and benign abdominal exam. (3/3)->225(3/5)->260(3/)->296(3/9) TVUS showing small cystic structure in endometrium with questionable yolk sac. Likely an abnormal intrauterine pregnancy given abnormal rise in HCG and possible yolk sac on TVUS; however, cannot rule out ectopic pregnancy at this time.  - Patient counseled on methotrexate vs. expectant management vs. outpatient D&C. Patient prefers expectant management at this time.  - Patient to follow up in 48 hours for repeat b-HCG in Dr. Ha' office  - Rh type: + .    No need for rhogam at this time.   - Ectopic precautions reviewed with patient, including sevre abdominal pain not relieved with medications, nausea, vomiting, dizziness, lightheadedness, heavy vaginal bleeding. Discussed with patient importance of follow up for b-HCG given unknown pregnancy location at this time.  Patient expressed understanding.  All questions and concerns addressed to patient's apparent satisfaction.   - Patient stable for d/c home from GYN perspective.  Primary management per ED team.      Amy Hylton, PGY2  d/w Dr Ha

## 2024-03-09 NOTE — ED PROVIDER NOTE - OBJECTIVE STATEMENT
This is a 39yo, pmh  , LMP  presenting for hcg recheck and us. Pt sees obgyn Dr Ha at [place]. Reports since the last er visit,   (3/2) she seen her ob on Tuesday, recheck hcg and us- which did not show anything. Yesterday she got a phone call from the Drs office to return to er for further evaluation.   Denies vaginal spotting, bleeding, abd cramps, n, v, headache, dizziness, lightheadedness, sob.

## 2024-03-09 NOTE — ED PROVIDER NOTE - NSFOLLOWUPINSTRUCTIONS_ED_ALL_ED_FT
You were seen today in the emergency room for recheck your HCG hormone .     PLEASE FOLLOW UP WITH DR. BARR OFFICE ON MONDAY.     Follow up with your primary doctor and OBGYN. If you do not have an OBGYN call  to schedule an appointment. Advance activity as tolerated.  Continue all previously prescribed medications as directed.  Follow up with your primary care physician in 48-72 hours- bring copies of your results.  Return to the ER for worsening or persistent symptoms, and/or ANY NEW OR CONCERNING SYMPTOMS. This includes but is not limited to increasing vaginal bleeding, lightheadedness, if you feel like you will pass out or for any other changes that concern you. Bring this packet to your doctors, it includes copies of your results. If you have issues obtaining follow up, please call: [phone] to obtain a doctor or specialist who takes your insurance in your area.  You may call [phone] to make an appointment with the internal medicine clinic.

## 2024-03-09 NOTE — ED ADULT TRIAGE NOTE - CHIEF COMPLAINT QUOTE
Pts OB/GYN sent for ultrasound and hormone level check. Hormone level not increasing as expected.  .  Pt denies vaginal bleeding or discharge. LMP 24.

## 2024-03-09 NOTE — ED PROVIDER NOTE - PATIENT PORTAL LINK FT
You can access the FollowMyHealth Patient Portal offered by [facility] by registering at the following website: [email or web address]. By joining [employer]’s FollowMyHealth portal, you will also be able to view your health information using other applications (apps) compatible with our system.

## 2024-03-09 NOTE — ED ADULT NURSE NOTE - OBJECTIVE STATEMENT
pt alert ,oriented x3. denies vag bleeding  ,spotting.  iv access    ,labs sent  denies abd pains. awaits ultrasound.  will continue to monitor

## 2024-03-09 NOTE — CONSULT NOTE ADULT - SUBJECTIVE AND OBJECTIVE BOX
RENETTA ACE  38y  Female [id number]    HPI: 37yo  LMP  @6wk presenting for follow up of PUL. Patient was initially seen in the [facility] for vaginal spotting on 3/3 and was found to have PUL. She has been following outpatient with Dr. Ha. Labs and imaging are as follows:    (3/3)->225(3/5)->260(3/7)    TVUS (3/3): no gestational sac/yolk sac, no adnexal masses, no free fluid  TVUS (3/5): no gestational sac/yolk sac, no adnexal masses, no free fluid    Patient currently denies abdominal pain, vaginal bleeding, lightheadedness, nausea, vomiting, chest pain, palpitations. She is on Lovenox for presumed history of APLS.      Name of GYN Physician:   OBHx: 2015 pLTCS for fetal distress, 7#5oz, c/b GDMA1 and need for postpartum blood transfusion  -SAB x5, first trimester  GynHx: Denies fibroids, cysts, endometriosis, STI's, Abnormal pap smears   PMH: denies  PSH: pLTCS  Meds: PNV, Folic acid  Allx: sulfa (perioral edema), PPI (perioral edema)  Social History:  Denies smoking use, drug use, alcohol use        Vital Signs Last 24 Hrs  T(C): 36.8 (09 Mar 2024 09:10), Max: 36.8 (09 Mar 2024 09:10)  T(F): 98.2 (09 Mar 2024 09:10), Max: 98.2 (09 Mar 2024 09:10)  HR: 65 (09 Mar 2024 09:10) (65 - 65)  BP: 123/79 (09 Mar 2024 09:10) (123/79 - 123/79)  BP(mean): --  RR: 17 (09 Mar 2024 09:10) (17 - 17)  SpO2: 96% (09 Mar 2024 09:10) (96% - 96%)    Parameters below as of 09 Mar 2024 09:10  Patient On (Oxygen Delivery Method): room air        Physical Exam:   General: sitting comfortably in bed, NAD   Abd: Soft, non-tender, non-distended.  Bowel sounds present.          LABS:                RADIOLOGY & ADDITIONAL STUDIES:     RENETTA ACE  38y  Female [id number]    HPI: 37yo  LMP  @6wk presenting for follow up of PUL. Patient was initially seen in the [facility] for vaginal spotting on 3/3 and was found to have PUL. She has been following outpatient with Dr. Ha. Labs and imaging are as follows:    (3/3)->225(3/5)->260(3/7)    TVUS (3/3): no gestational sac/yolk sac, no adnexal masses, no free fluid  TVUS (3/5): no gestational sac/yolk sac, no adnexal masses, no free fluid    Patient currently denies abdominal pain, vaginal bleeding, lightheadedness, nausea, vomiting, chest pain, palpitations. She is on Lovenox for presumed history of APLS.      Name of GYN Physician:   OBHx: 2015 pLTCS for fetal distress, 7#5oz, c/b GDMA1 and need for postpartum blood transfusion  -SAB x5, first trimester  GynHx: Denies fibroids, cysts, endometriosis, STI's, Abnormal pap smears   PMH: denies  PSH: pLTCS  Meds: PNV, Folic acid  Allx: sulfa (perioral edema), PPI (perioral edema)  Social History:  Denies smoking use, drug use, alcohol use        Vital Signs Last 24 Hrs  T(C): 36.8 (09 Mar 2024 09:10), Max: 36.8 (09 Mar 2024 09:10)  T(F): 98.2 (09 Mar 2024 09:10), Max: 98.2 (09 Mar 2024 09:10)  HR: 65 (09 Mar 2024 09:10) (65 - 65)  BP: 123/79 (09 Mar 2024 09:10) (123/79 - 123/79)  BP(mean): --  RR: 17 (09 Mar 2024 09:10) (17 - 17)  SpO2: 96% (09 Mar 2024 09:10) (96% - 96%)    Parameters below as of 09 Mar 2024 09:10  Patient On (Oxygen Delivery Method): room air        Physical Exam:   General: sitting comfortably in bed, NAD   Abd: Soft, non-tender, non-distended.  Bowel sounds present.          LABS:               10.8   7.12  )-----------( 375      (  @ 10:15 )             35.4      @ 10:15    141  |  106  |  10  ----------------------------<  98  3.9   |  24  |  0.50      HCG (3/9): 296        RADIOLOGY & ADDITIONAL STUDIES:  FINDINGS:  Uterus: 8.5 x 5.6 x 6.0 cm.    Endometrium: 10 mm. 3 mm cystic focus with questionable yolk sac.    Right ovary: 3.5 cm x 2.9 cm x 2.6 cm. Corpus luteal cyst.  Left ovary: 3.7 cm x 2.5 cm x 2.9 cm. Within normal limits.    Fluid: None.    IMPRESSION:  Small cystic structure in the endometrium with questionable yolk sac,   which may represent early intrauterine gestation. Recommend follow-up   with serial serum beta-hCG and pelvic ultrasound as pseudosac cannot be   definitively excluded.      --- End of Report ---

## 2024-03-09 NOTE — ED PROVIDER NOTE - PROGRESS NOTE DETAILS
ROHINI Rodgers- ob recommendation follow up with Dr Ha on Monday. Strict return precautions discussed.

## 2024-03-09 NOTE — ED ADULT NURSE NOTE - NSFALLUNIVINTERV_ED_ALL_ED
Bed/Stretcher in lowest position, wheels locked, appropriate side rails in place/Call bell, personal items and telephone in reach/Instruct patient to call for assistance before getting out of bed/chair/stretcher/Non-slip footwear applied when patient is off stretcher/[place] to call system/Physically safe environment - no spills, clutter or unnecessary equipment/Purposeful proactive rounding/Room/bathroom lighting operational, light cord in reach

## 2024-03-09 NOTE — CONSULT NOTE ADULT - ATTENDING COMMENTS
Pt seen and evaluated at bedside  Agree with above  Pt well known to me, following for PUL  bhcg with inappropriate uptrend today, ?yolk sac within uterine cavity   Highly desires pregnancy  Repeat hcg in 48 hours  Ectopic precautions    daysi BUTLER

## 2024-03-09 NOTE — ED PROVIDER NOTE - CLINICAL SUMMARY MEDICAL DECISION MAKING FREE TEXT BOX
This is a 39yo, pmh  , LMP  presenting for hcg recheck and us. Pt sees obgyn Dr Ha at [place]. Reports since the last er visit,   (3/2) she seen her ob on Tuesday, recheck hcg and us- which did not show anything. Yesterday she got a phone call from the Drs office to return to er for further evaluation.   Denies vaginal spotting, bleeding, abd cramps, n, v, headache, dizziness, lightheadedness, sob.   preop labs, hcg, us, ob consult

## 2024-03-15 ENCOUNTER — APPOINTMENT (OUTPATIENT)
Dept: OBGYN | Facility: CLINIC | Age: 39
End: 2024-03-15

## 2024-03-15 LAB — HCG SERPL-MCNC: 6 MIU/ML

## 2024-03-22 ENCOUNTER — APPOINTMENT (OUTPATIENT)
Dept: OBGYN | Facility: CLINIC | Age: 39
End: 2024-03-22
Payer: MEDICAID

## 2024-03-22 VITALS
SYSTOLIC BLOOD PRESSURE: 131 MMHG | DIASTOLIC BLOOD PRESSURE: 80 MMHG | BODY MASS INDEX: 32.32 KG/M2 | WEIGHT: 194 LBS | HEIGHT: 65 IN | HEART RATE: 80 BPM

## 2024-03-22 DIAGNOSIS — Z32.01 ENCOUNTER FOR PREGNANCY TEST, RESULT POSITIVE: ICD-10-CM

## 2024-03-22 DIAGNOSIS — N96 RECURRENT PREGNANCY LOSS: ICD-10-CM

## 2024-03-22 PROCEDURE — 99213 OFFICE O/P EST LOW 20 MIN: CPT | Mod: TH

## 2024-03-23 LAB — HCG SERPL-MCNC: <1 MIU/ML

## 2024-03-23 NOTE — PHYSICAL EXAM
[Appropriately responsive] : appropriately responsive [No Acute Distress] : no acute distress [Alert] : alert [Regular Rate Rhythm] : regular rate rhythm [No Lymphadenopathy] : no lymphadenopathy [No Murmurs] : no murmurs [Clear to Auscultation B/L] : clear to auscultation bilaterally [Non-tender] : non-tender [Soft] : soft [Non-distended] : non-distended [No HSM] : No HSM [No Mass] : no mass [No Lesions] : no lesions [Oriented x3] : oriented x3

## 2024-03-23 NOTE — PLAN
[FreeTextEntry1] : 37y/o presents for followup for SAB -bhcg today -Pt counseled to discontinue Lovenox -Referral to [facility] provided, pt with extensive workup with previous physician, WNL. Discussed possibility of IVF with PGT   daysi BUTLER

## 2024-03-23 NOTE — HISTORY OF PRESENT ILLNESS
[FreeTextEntry1] : 37y/o presents for followup after spontaneous . LMP , pt has been experiencing vaginal bleeding. Most recent bhcg level 6  Denies vaginal bleeding today. Denies abdominal pain. Denies fevers/chills.  Pt interested in fertility referral, this is pts 6th first trimester SAB

## 2024-03-23 NOTE — COUNSELING
[Preconception Care/ Fertility options] : preconception care, fertility options [Medication Management] : medication management [Lab Results] : lab results

## 2024-04-11 ENCOUNTER — APPOINTMENT (OUTPATIENT)
Dept: HUMAN REPRODUCTION | Facility: CLINIC | Age: 39
End: 2024-04-11
Payer: SELF-PAY

## 2024-04-11 PROCEDURE — 76830 TRANSVAGINAL US NON-OB: CPT

## 2024-04-11 PROCEDURE — 36415 COLL VENOUS BLD VENIPUNCTURE: CPT

## 2024-04-11 PROCEDURE — 99205 OFFICE O/P NEW HI 60 MIN: CPT | Mod: 25

## 2024-05-01 ENCOUNTER — OUTPATIENT (OUTPATIENT)
Dept: OUTPATIENT SERVICES | Facility: HOSPITAL | Age: 39
LOS: 1 days | End: 2024-05-01
Payer: MEDICAID

## 2024-05-01 ENCOUNTER — RESULT REVIEW (OUTPATIENT)
Age: 39
End: 2024-05-01

## 2024-05-01 VITALS
DIASTOLIC BLOOD PRESSURE: 80 MMHG | RESPIRATION RATE: 17 BRPM | HEART RATE: 100 BPM | TEMPERATURE: 98 F | SYSTOLIC BLOOD PRESSURE: 143 MMHG | OXYGEN SATURATION: 98 %

## 2024-05-01 VITALS
HEART RATE: 77 BPM | DIASTOLIC BLOOD PRESSURE: 75 MMHG | TEMPERATURE: 98 F | SYSTOLIC BLOOD PRESSURE: 142 MMHG | OXYGEN SATURATION: 97 % | RESPIRATION RATE: 17 BRPM

## 2024-05-01 DIAGNOSIS — N97.9 FEMALE INFERTILITY, UNSPECIFIED: ICD-10-CM

## 2024-05-01 LAB — HCG UR QL: NEGATIVE — SIGNIFICANT CHANGE UP

## 2024-05-01 PROCEDURE — 74740 X-RAY FEMALE GENITAL TRACT: CPT | Mod: 26,GC

## 2024-05-01 PROCEDURE — 58340 CATHETER FOR HYSTEROGRAPHY: CPT | Mod: GC

## 2024-05-30 ENCOUNTER — OUTPATIENT (OUTPATIENT)
Dept: OUTPATIENT SERVICES | Facility: HOSPITAL | Age: 39
LOS: 1 days | Discharge: HOME | End: 2024-05-30

## 2024-05-30 DIAGNOSIS — N96 RECURRENT PREGNANCY LOSS: ICD-10-CM

## 2024-06-05 ENCOUNTER — NON-APPOINTMENT (OUTPATIENT)
Age: 39
End: 2024-06-05

## 2024-06-06 ENCOUNTER — RESULT REVIEW (OUTPATIENT)
Age: 39
End: 2024-06-06

## 2024-06-06 ENCOUNTER — APPOINTMENT (OUTPATIENT)
Dept: HEMATOLOGY ONCOLOGY | Facility: CLINIC | Age: 39
End: 2024-06-06
Payer: MEDICAID

## 2024-06-06 VITALS
BODY MASS INDEX: 30.88 KG/M2 | TEMPERATURE: 98.9 F | WEIGHT: 194.43 LBS | RESPIRATION RATE: 16 BRPM | OXYGEN SATURATION: 96 % | DIASTOLIC BLOOD PRESSURE: 86 MMHG | HEART RATE: 59 BPM | HEIGHT: 66.54 IN | SYSTOLIC BLOOD PRESSURE: 126 MMHG

## 2024-06-06 DIAGNOSIS — Z78.9 OTHER SPECIFIED HEALTH STATUS: ICD-10-CM

## 2024-06-06 LAB
BASOPHILS # BLD AUTO: 0.04 K/UL — SIGNIFICANT CHANGE UP (ref 0–0.2)
BASOPHILS NFR BLD AUTO: 0.6 % — SIGNIFICANT CHANGE UP (ref 0–2)
EOSINOPHIL # BLD AUTO: 0.07 K/UL — SIGNIFICANT CHANGE UP (ref 0–0.5)
EOSINOPHIL NFR BLD AUTO: 1.1 % — SIGNIFICANT CHANGE UP (ref 0–6)
HCT VFR BLD CALC: 41.8 % — SIGNIFICANT CHANGE UP (ref 34.5–45)
HGB BLD-MCNC: 12.6 G/DL — SIGNIFICANT CHANGE UP (ref 11.5–15.5)
IMM GRANULOCYTES NFR BLD AUTO: 1.2 % — HIGH (ref 0–0.9)
LYMPHOCYTES # BLD AUTO: 1.69 K/UL — SIGNIFICANT CHANGE UP (ref 1–3.3)
LYMPHOCYTES # BLD AUTO: 25.4 % — SIGNIFICANT CHANGE UP (ref 13–44)
MCHC RBC-ENTMCNC: 26.6 PG — LOW (ref 27–34)
MCHC RBC-ENTMCNC: 30.1 G/DL — LOW (ref 32–36)
MCV RBC AUTO: 88.4 FL — SIGNIFICANT CHANGE UP (ref 80–100)
MONOCYTES # BLD AUTO: 0.34 K/UL — SIGNIFICANT CHANGE UP (ref 0–0.9)
MONOCYTES NFR BLD AUTO: 5.1 % — SIGNIFICANT CHANGE UP (ref 2–14)
NEUTROPHILS # BLD AUTO: 4.43 K/UL — SIGNIFICANT CHANGE UP (ref 1.8–7.4)
NEUTROPHILS NFR BLD AUTO: 66.6 % — SIGNIFICANT CHANGE UP (ref 43–77)
NRBC # BLD: 0 /100 WBCS — SIGNIFICANT CHANGE UP (ref 0–0)
PLATELET # BLD AUTO: 487 K/UL — HIGH (ref 150–400)
RBC # BLD: 4.73 M/UL — SIGNIFICANT CHANGE UP (ref 3.8–5.2)
RBC # FLD: 13.2 % — SIGNIFICANT CHANGE UP (ref 10.3–14.5)
RETICS #: 97 K/UL — SIGNIFICANT CHANGE UP (ref 25–125)
RETICS/RBC NFR: 2.1 % — SIGNIFICANT CHANGE UP (ref 0.5–2.5)
WBC # BLD: 6.65 K/UL — SIGNIFICANT CHANGE UP (ref 3.8–10.5)
WBC # FLD AUTO: 6.65 K/UL — SIGNIFICANT CHANGE UP (ref 3.8–10.5)

## 2024-06-06 PROCEDURE — 99205 OFFICE O/P NEW HI 60 MIN: CPT

## 2024-06-06 RX ORDER — ENOXAPARIN SODIUM 40 MG/.4ML
40 INJECTION, SOLUTION SUBCUTANEOUS
Qty: 30 | Refills: 9 | Status: DISCONTINUED | COMMUNITY
Start: 2024-02-26 | End: 2024-06-06

## 2024-06-06 NOTE — HISTORY OF PRESENT ILLNESS
[de-identified] : This is a  38 year old woman with no significant past medical history.  She presents for the evaluation of a history of recurrent miscarriages.  A5.  Patient had a genetics evaluation and 502 gene panel was signficant for carriership of Pendred syndrome and primary ciliary dyskinesia.   was negative for any.  Pregnancy history as follows  Boy, living child, no complications.    miscarriage in 2016 after "2 weeks"  Miscarriage after "2 weeks"  Miscarriage after "3 weeks"  miscarriage after "2 weeks"  miscairrage after 8 weeks was on lovenox starting immediately after pregnancy testing.    In  and , patient had a workup that included anticardiolipin antibodies and anti beta 2 glycoprotein in the 20's. DRVVT negative both times and silica time was abnormal at 1.3 ratio in , and then 1.26 in .  Elizabeth has a history of a prolonged PTT 39 seconds.  THis did correct on mixing study and did not prolong against control on incubation.     Patient had started the lovenox immediately upon the notice that hse was pregnant.    Elizabeth has no signficant  gynecologic history.

## 2024-06-06 NOTE — ASSESSMENT
[FreeTextEntry1] : This is a  38 year old woman with no significant past medical history.  She presents for the evaluation of a history of recurrent miscarriages.  A5.  Her first pregnancy was successful without complications however patient went on to have 5 consecutive early miscarriages. The most recent one occurred at 8 weeks despite starting Lovebox 40mg SQ daily.  In  and , patient had a workup that included anticardiolipin antibodies and anti beta 2 glycoprotein in the 20's. DRVVT negative both times and silica time was abnormal at 1.3 ratio in , and then 1.26 in .  Patient has a history of a prolonged PTT 39 seconds.  This did correct on mixing study and did not prolong against control on incubation.     Factor V Leiden was negative in , along with prothrombin gene mutation. The ATIII, Protein C and S were anormal at the time. We could repeat these given that these are subject to change.  Would repeat the Silica time and the DRVVT, given the borderline result of the Silica test this could have easily reverted to normal.  Repeat anti Beta 2 and Anticardiolipin antibodies were int he 20's but not heigh enough to meet Elisa criteria for APLS though the positive Silica time already qualifies despite its low ratio.  If the workup is again unremarkable for anything else but the silica clotting time. we can proceed with the next pregnancy with the assistance of lovenox 40mg SQ chi y asp prophylaxis with the  change in the stat time of the Lovenox. There is some evidence that coagulation is required for the early implantation of the embryo, would aim to start lovenox at round 7 weeks given th e previous pegnancy was lost at 8 weeks.

## 2024-06-25 LAB
AT III PPP CHRO-ACNC: 96 %
CONFIRM: 29.9 SEC
DRVVT IMM 1:2 NP PPP: NORMAL
DRVVT SCREEN TO CONFIRM RATIO: 1 RATIO
FERRITIN SERPL-MCNC: 13 NG/ML
HCYS SERPL-MCNC: 12 UMOL/L
IRON SATN MFR SERPL: 15 %
IRON SERPL-MCNC: 68 UG/DL
PROT C PPP CHRO-ACNC: 110 %
PROT S AG ACT/NOR PPP IA: 83 %
PROT S FREE PPP-ACNC: 87 %
SCREEN DRVVT: 35.1 SEC
SILICA CLOTTING TIME INTERPRETATION: ABNORMAL
SILICA CLOTTING TIME S/C: 1.19 RATIO
TIBC SERPL-MCNC: 441 UG/DL
UIBC SERPL-MCNC: 373 UG/DL

## 2024-08-16 ENCOUNTER — INPATIENT (INPATIENT)
Facility: HOSPITAL | Age: 39
LOS: 0 days | Discharge: HOME | End: 2024-08-17
Attending: STUDENT IN AN ORGANIZED HEALTH CARE EDUCATION/TRAINING PROGRAM | Admitting: STUDENT IN AN ORGANIZED HEALTH CARE EDUCATION/TRAINING PROGRAM
Payer: MEDICAID

## 2024-08-16 ENCOUNTER — TRANSCRIPTION ENCOUNTER (OUTPATIENT)
Age: 39
End: 2024-08-16

## 2024-08-16 VITALS
SYSTOLIC BLOOD PRESSURE: 142 MMHG | OXYGEN SATURATION: 97 % | DIASTOLIC BLOOD PRESSURE: 90 MMHG | TEMPERATURE: 98 F | WEIGHT: 195.11 LBS | HEIGHT: 67 IN | RESPIRATION RATE: 16 BRPM | HEART RATE: 72 BPM

## 2024-08-16 DIAGNOSIS — R10.9 UNSPECIFIED ABDOMINAL PAIN: ICD-10-CM

## 2024-08-16 LAB
ADD ON TEST-SPECIMEN IN LAB: SIGNIFICANT CHANGE UP
ALBUMIN SERPL ELPH-MCNC: 4.3 G/DL — SIGNIFICANT CHANGE UP (ref 3.3–5)
ALP SERPL-CCNC: 60 U/L — SIGNIFICANT CHANGE UP (ref 40–120)
ALT FLD-CCNC: 12 U/L — SIGNIFICANT CHANGE UP (ref 4–33)
ANION GAP SERPL CALC-SCNC: 10 MMOL/L — SIGNIFICANT CHANGE UP (ref 7–14)
APPEARANCE UR: CLEAR — SIGNIFICANT CHANGE UP
APTT BLD: 38.7 SEC — HIGH (ref 24.5–35.6)
AST SERPL-CCNC: 13 U/L — SIGNIFICANT CHANGE UP (ref 4–32)
BACTERIA # UR AUTO: NEGATIVE /HPF — SIGNIFICANT CHANGE UP
BASOPHILS # BLD AUTO: 0.02 K/UL — SIGNIFICANT CHANGE UP (ref 0–0.2)
BASOPHILS NFR BLD AUTO: 0.2 % — SIGNIFICANT CHANGE UP (ref 0–2)
BILIRUB SERPL-MCNC: 0.3 MG/DL — SIGNIFICANT CHANGE UP (ref 0.2–1.2)
BILIRUB UR-MCNC: NEGATIVE — SIGNIFICANT CHANGE UP
BLD GP AB SCN SERPL QL: NEGATIVE — SIGNIFICANT CHANGE UP
BUN SERPL-MCNC: 11 MG/DL — SIGNIFICANT CHANGE UP (ref 7–23)
CALCIUM SERPL-MCNC: 9.4 MG/DL — SIGNIFICANT CHANGE UP (ref 8.4–10.5)
CAST: 0 /LPF — SIGNIFICANT CHANGE UP (ref 0–4)
CHLORIDE SERPL-SCNC: 104 MMOL/L — SIGNIFICANT CHANGE UP (ref 98–107)
CO2 SERPL-SCNC: 23 MMOL/L — SIGNIFICANT CHANGE UP (ref 22–31)
COLOR SPEC: YELLOW — SIGNIFICANT CHANGE UP
CREAT SERPL-MCNC: 0.63 MG/DL — SIGNIFICANT CHANGE UP (ref 0.5–1.3)
DIFF PNL FLD: ABNORMAL
EGFR: 118 ML/MIN/1.73M2 — SIGNIFICANT CHANGE UP
EGFR: 118 ML/MIN/1.73M2 — SIGNIFICANT CHANGE UP
EOSINOPHIL # BLD AUTO: 0.08 K/UL — SIGNIFICANT CHANGE UP (ref 0–0.5)
EOSINOPHIL NFR BLD AUTO: 0.9 % — SIGNIFICANT CHANGE UP (ref 0–6)
GLUCOSE BLDC GLUCOMTR-MCNC: 89 MG/DL — SIGNIFICANT CHANGE UP (ref 70–99)
GLUCOSE SERPL-MCNC: 109 MG/DL — HIGH (ref 70–99)
GLUCOSE UR QL: NEGATIVE MG/DL — SIGNIFICANT CHANGE UP
HCG UR QL: NEGATIVE — SIGNIFICANT CHANGE UP
HCT VFR BLD CALC: 34.5 % — SIGNIFICANT CHANGE UP (ref 34.5–45)
HGB BLD-MCNC: 10.7 G/DL — LOW (ref 11.5–15.5)
IANC: 5.78 K/UL — SIGNIFICANT CHANGE UP (ref 1.8–7.4)
IMM GRANULOCYTES NFR BLD AUTO: 0.7 % — SIGNIFICANT CHANGE UP (ref 0–0.9)
INR BLD: 1.09 RATIO — SIGNIFICANT CHANGE UP (ref 0.85–1.18)
KETONES UR-MCNC: NEGATIVE MG/DL — SIGNIFICANT CHANGE UP
LEUKOCYTE ESTERASE UR-ACNC: NEGATIVE — SIGNIFICANT CHANGE UP
LYMPHOCYTES # BLD AUTO: 2.44 K/UL — SIGNIFICANT CHANGE UP (ref 1–3.3)
LYMPHOCYTES # BLD AUTO: 27.3 % — SIGNIFICANT CHANGE UP (ref 13–44)
MCHC RBC-ENTMCNC: 26.9 PG — LOW (ref 27–34)
MCHC RBC-ENTMCNC: 31 GM/DL — LOW (ref 32–36)
MCV RBC AUTO: 86.7 FL — SIGNIFICANT CHANGE UP (ref 80–100)
MONOCYTES # BLD AUTO: 0.55 K/UL — SIGNIFICANT CHANGE UP (ref 0–0.9)
MONOCYTES NFR BLD AUTO: 6.2 % — SIGNIFICANT CHANGE UP (ref 2–14)
NEUTROPHILS # BLD AUTO: 5.78 K/UL — SIGNIFICANT CHANGE UP (ref 1.8–7.4)
NEUTROPHILS NFR BLD AUTO: 64.7 % — SIGNIFICANT CHANGE UP (ref 43–77)
NITRITE UR-MCNC: NEGATIVE — SIGNIFICANT CHANGE UP
NRBC # BLD AUTO: 0 K/UL — SIGNIFICANT CHANGE UP (ref 0–0)
NRBC # BLD: 0 /100 WBCS — SIGNIFICANT CHANGE UP (ref 0–0)
NRBC # FLD: 0 K/UL — SIGNIFICANT CHANGE UP (ref 0–0)
NRBC BLD-RTO: 0 /100 WBCS — SIGNIFICANT CHANGE UP (ref 0–0)
PH UR: 6 — SIGNIFICANT CHANGE UP (ref 5–8)
PLATELET # BLD AUTO: 444 K/UL — HIGH (ref 150–400)
POTASSIUM SERPL-MCNC: 3.7 MMOL/L — SIGNIFICANT CHANGE UP (ref 3.5–5.3)
POTASSIUM SERPL-SCNC: 3.7 MMOL/L — SIGNIFICANT CHANGE UP (ref 3.5–5.3)
PROT SERPL-MCNC: 8.3 G/DL — SIGNIFICANT CHANGE UP (ref 6–8.3)
PROT UR-MCNC: NEGATIVE MG/DL — SIGNIFICANT CHANGE UP
PROTHROM AB SERPL-ACNC: 12.3 SEC — SIGNIFICANT CHANGE UP (ref 9.5–13)
RBC # BLD: 3.98 M/UL — SIGNIFICANT CHANGE UP (ref 3.8–5.2)
RBC # FLD: 13.2 % — SIGNIFICANT CHANGE UP (ref 10.3–14.5)
RBC CASTS # UR COMP ASSIST: 1 /HPF — SIGNIFICANT CHANGE UP (ref 0–4)
RH IG SCN BLD-IMP: POSITIVE — SIGNIFICANT CHANGE UP
SODIUM SERPL-SCNC: 137 MMOL/L — SIGNIFICANT CHANGE UP (ref 135–145)
SP GR SPEC: 1.02 — SIGNIFICANT CHANGE UP (ref 1–1.03)
SQUAMOUS # UR AUTO: 1 /HPF — SIGNIFICANT CHANGE UP (ref 0–5)
UROBILINOGEN FLD QL: 0.2 MG/DL — SIGNIFICANT CHANGE UP (ref 0.2–1)
WBC # BLD: 8.93 K/UL — SIGNIFICANT CHANGE UP (ref 3.8–10.5)
WBC # FLD AUTO: 8.93 K/UL — SIGNIFICANT CHANGE UP (ref 3.8–10.5)
WBC UR QL: 2 /HPF — SIGNIFICANT CHANGE UP (ref 0–5)

## 2024-08-16 PROCEDURE — 74177 CT ABD & PELVIS W/CONTRAST: CPT | Mod: 26,MC

## 2024-08-16 PROCEDURE — 76705 ECHO EXAM OF ABDOMEN: CPT | Mod: 26

## 2024-08-16 PROCEDURE — 99285 EMERGENCY DEPT VISIT HI MDM: CPT | Mod: 25

## 2024-08-16 PROCEDURE — 99222 1ST HOSP IP/OBS MODERATE 55: CPT | Mod: 57,GC

## 2024-08-16 RX ORDER — HYDROMORPHONE/SOD CHLOR,ISO/PF 2 MG/10 ML
0.2 SYRINGE (ML) INJECTION EVERY 4 HOURS
Refills: 0 | Status: DISCONTINUED | OUTPATIENT
Start: 2024-08-16 | End: 2024-08-17

## 2024-08-16 RX ORDER — ENOXAPARIN SODIUM 100 MG/ML
40 INJECTION SUBCUTANEOUS EVERY 24 HOURS
Refills: 0 | Status: DISCONTINUED | OUTPATIENT
Start: 2024-08-16 | End: 2024-08-17

## 2024-08-16 RX ORDER — HYDROMORPHONE/SOD CHLOR,ISO/PF 2 MG/10 ML
0.5 SYRINGE (ML) INJECTION EVERY 4 HOURS
Refills: 0 | Status: DISCONTINUED | OUTPATIENT
Start: 2024-08-16 | End: 2024-08-17

## 2024-08-16 RX ORDER — ACETAMINOPHEN 500 MG/5ML
1000 LIQUID (ML) ORAL ONCE
Refills: 0 | Status: COMPLETED | OUTPATIENT
Start: 2024-08-16 | End: 2024-08-16

## 2024-08-16 RX ORDER — KETOROLAC TROMETHAMINE 30 MG/ML
15 INJECTION, SOLUTION INTRAMUSCULAR; INTRAVENOUS ONCE
Refills: 0 | Status: DISCONTINUED | OUTPATIENT
Start: 2024-08-16 | End: 2024-08-16

## 2024-08-16 RX ORDER — ACETAMINOPHEN 500 MG/5ML
1000 LIQUID (ML) ORAL ONCE
Refills: 0 | Status: DISCONTINUED | OUTPATIENT
Start: 2024-08-16 | End: 2024-08-17

## 2024-08-16 RX ORDER — SODIUM CHLORIDE 9 G/1000ML
1000 INJECTION, SOLUTION INTRAVENOUS
Refills: 0 | Status: DISCONTINUED | OUTPATIENT
Start: 2024-08-16 | End: 2024-08-17

## 2024-08-16 RX ADMIN — Medication 400 MILLIGRAM(S): at 22:10

## 2024-08-16 RX ADMIN — Medication 400 MILLIGRAM(S): at 03:00

## 2024-08-16 RX ADMIN — SODIUM CHLORIDE 100 MILLILITER(S): 9 INJECTION, SOLUTION INTRAVENOUS at 09:38

## 2024-08-16 RX ADMIN — Medication 0.5 MILLIGRAM(S): at 14:06

## 2024-08-16 RX ADMIN — Medication 0.5 MILLIGRAM(S): at 14:26

## 2024-08-16 RX ADMIN — ENOXAPARIN SODIUM 40 MILLIGRAM(S): 100 INJECTION SUBCUTANEOUS at 14:06

## 2024-08-16 RX ADMIN — KETOROLAC TROMETHAMINE 15 MILLIGRAM(S): 30 INJECTION, SOLUTION INTRAMUSCULAR; INTRAVENOUS at 07:30

## 2024-08-17 ENCOUNTER — TRANSCRIPTION ENCOUNTER (OUTPATIENT)
Age: 39
End: 2024-08-17

## 2024-08-17 VITALS
HEART RATE: 64 BPM | TEMPERATURE: 97 F | OXYGEN SATURATION: 97 % | DIASTOLIC BLOOD PRESSURE: 86 MMHG | RESPIRATION RATE: 17 BRPM | SYSTOLIC BLOOD PRESSURE: 120 MMHG

## 2024-08-17 LAB
ANION GAP SERPL CALC-SCNC: 11 MMOL/L — SIGNIFICANT CHANGE UP (ref 7–14)
BLD GP AB SCN SERPL QL: NEGATIVE — SIGNIFICANT CHANGE UP
BUN SERPL-MCNC: 7 MG/DL — SIGNIFICANT CHANGE UP (ref 7–23)
CALCIUM SERPL-MCNC: 9.1 MG/DL — SIGNIFICANT CHANGE UP (ref 8.4–10.5)
CHLORIDE SERPL-SCNC: 103 MMOL/L — SIGNIFICANT CHANGE UP (ref 98–107)
CO2 SERPL-SCNC: 22 MMOL/L — SIGNIFICANT CHANGE UP (ref 22–31)
CREAT SERPL-MCNC: 0.53 MG/DL — SIGNIFICANT CHANGE UP (ref 0.5–1.3)
CULTURE RESULTS: SIGNIFICANT CHANGE UP
EGFR: 123 ML/MIN/1.73M2 — SIGNIFICANT CHANGE UP
EGFR: 123 ML/MIN/1.73M2 — SIGNIFICANT CHANGE UP
GLUCOSE SERPL-MCNC: 142 MG/DL — HIGH (ref 70–99)
HCT VFR BLD CALC: 35.4 % — SIGNIFICANT CHANGE UP (ref 34.5–45)
HGB BLD-MCNC: 10.8 G/DL — LOW (ref 11.5–15.5)
MAGNESIUM SERPL-MCNC: 2.1 MG/DL — SIGNIFICANT CHANGE UP (ref 1.6–2.6)
MCHC RBC-ENTMCNC: 26.5 PG — LOW (ref 27–34)
MCHC RBC-ENTMCNC: 30.5 GM/DL — LOW (ref 32–36)
MCV RBC AUTO: 86.8 FL — SIGNIFICANT CHANGE UP (ref 80–100)
NRBC # BLD AUTO: 0 K/UL — SIGNIFICANT CHANGE UP (ref 0–0)
NRBC # BLD: 0 /100 WBCS — SIGNIFICANT CHANGE UP (ref 0–0)
NRBC # FLD: 0 K/UL — SIGNIFICANT CHANGE UP (ref 0–0)
NRBC BLD-RTO: 0 /100 WBCS — SIGNIFICANT CHANGE UP (ref 0–0)
PHOSPHATE SERPL-MCNC: 3 MG/DL — SIGNIFICANT CHANGE UP (ref 2.5–4.5)
PLATELET # BLD AUTO: 424 K/UL — HIGH (ref 150–400)
POTASSIUM SERPL-MCNC: 4 MMOL/L — SIGNIFICANT CHANGE UP (ref 3.5–5.3)
POTASSIUM SERPL-SCNC: 4 MMOL/L — SIGNIFICANT CHANGE UP (ref 3.5–5.3)
RBC # BLD: 4.08 M/UL — SIGNIFICANT CHANGE UP (ref 3.8–5.2)
RBC # FLD: 13.2 % — SIGNIFICANT CHANGE UP (ref 10.3–14.5)
RH IG SCN BLD-IMP: POSITIVE — SIGNIFICANT CHANGE UP
SODIUM SERPL-SCNC: 136 MMOL/L — SIGNIFICANT CHANGE UP (ref 135–145)
SPECIMEN SOURCE: SIGNIFICANT CHANGE UP
WBC # BLD: 12.87 K/UL — HIGH (ref 3.8–10.5)
WBC # FLD AUTO: 12.87 K/UL — HIGH (ref 3.8–10.5)

## 2024-08-17 PROCEDURE — 49593 RPR AA HRN 1ST 3-10 RDC: CPT | Mod: GC

## 2024-08-17 RX ORDER — HYDROMORPHONE/SOD CHLOR,ISO/PF 2 MG/10 ML
0.5 SYRINGE (ML) INJECTION
Refills: 0 | Status: DISCONTINUED | OUTPATIENT
Start: 2024-08-17 | End: 2024-08-17

## 2024-08-17 RX ORDER — OXYCODONE HYDROCHLORIDE 30 MG/1
1 TABLET ORAL
Qty: 8 | Refills: 0
Start: 2024-08-17 | End: 2024-08-18

## 2024-08-17 RX ORDER — IBUPROFEN 200 MG
400 TABLET ORAL EVERY 6 HOURS
Refills: 0 | Status: DISCONTINUED | OUTPATIENT
Start: 2024-08-17 | End: 2024-08-17

## 2024-08-17 RX ORDER — ONDANSETRON HCL/PF 4 MG/2 ML
4 VIAL (ML) INJECTION ONCE
Refills: 0 | Status: DISCONTINUED | OUTPATIENT
Start: 2024-08-17 | End: 2024-08-17

## 2024-08-17 RX ORDER — IBUPROFEN 200 MG
1 TABLET ORAL
Qty: 0 | Refills: 0 | DISCHARGE
Start: 2024-08-17

## 2024-08-17 RX ORDER — ACETAMINOPHEN 500 MG/5ML
650 LIQUID (ML) ORAL EVERY 6 HOURS
Refills: 0 | Status: DISCONTINUED | OUTPATIENT
Start: 2024-08-17 | End: 2024-08-17

## 2024-08-17 RX ORDER — HYDROMORPHONE/SOD CHLOR,ISO/PF 2 MG/10 ML
0.2 SYRINGE (ML) INJECTION EVERY 6 HOURS
Refills: 0 | Status: DISCONTINUED | OUTPATIENT
Start: 2024-08-17 | End: 2024-08-17

## 2024-08-17 RX ORDER — ACETAMINOPHEN 500 MG/5ML
3 LIQUID (ML) ORAL
Qty: 0 | Refills: 0 | DISCHARGE
Start: 2024-08-17

## 2024-08-17 RX ORDER — OXYCODONE HYDROCHLORIDE 30 MG/1
5 TABLET ORAL EVERY 6 HOURS
Refills: 0 | Status: DISCONTINUED | OUTPATIENT
Start: 2024-08-17 | End: 2024-08-17

## 2024-08-17 RX ORDER — ACETAMINOPHEN 500 MG/5ML
975 LIQUID (ML) ORAL EVERY 6 HOURS
Refills: 0 | Status: DISCONTINUED | OUTPATIENT
Start: 2024-08-17 | End: 2024-08-17

## 2024-08-17 RX ORDER — ONDANSETRON HCL/PF 4 MG/2 ML
1 VIAL (ML) INJECTION
Qty: 10 | Refills: 0
Start: 2024-08-17 | End: 2024-08-21

## 2024-08-17 RX ORDER — OXYCODONE HYDROCHLORIDE 30 MG/1
2.5 TABLET ORAL EVERY 6 HOURS
Refills: 0 | Status: DISCONTINUED | OUTPATIENT
Start: 2024-08-17 | End: 2024-08-17

## 2024-08-17 RX ORDER — ONDANSETRON HCL/PF 4 MG/2 ML
8 VIAL (ML) INJECTION ONCE
Refills: 0 | Status: DISCONTINUED | OUTPATIENT
Start: 2024-08-17 | End: 2024-08-17

## 2024-08-17 RX ORDER — ACETAMINOPHEN 500 MG/5ML
1000 LIQUID (ML) ORAL ONCE
Refills: 0 | Status: COMPLETED | OUTPATIENT
Start: 2024-08-17 | End: 2024-08-17

## 2024-08-17 RX ORDER — OXYCODONE HYDROCHLORIDE 30 MG/1
5 TABLET ORAL ONCE
Refills: 0 | Status: DISCONTINUED | OUTPATIENT
Start: 2024-08-17 | End: 2024-08-17

## 2024-08-17 RX ADMIN — Medication 400 MILLIGRAM(S): at 11:19

## 2024-08-17 RX ADMIN — Medication 400 MILLIGRAM(S): at 06:42

## 2024-08-17 RX ADMIN — Medication 0.5 MILLIGRAM(S): at 02:42

## 2024-08-17 RX ADMIN — Medication 0.5 MILLIGRAM(S): at 02:57

## 2024-08-17 RX ADMIN — OXYCODONE HYDROCHLORIDE 5 MILLIGRAM(S): 30 TABLET ORAL at 04:15

## 2024-08-17 RX ADMIN — Medication 975 MILLIGRAM(S): at 11:19

## 2024-08-17 RX ADMIN — OXYCODONE HYDROCHLORIDE 5 MILLIGRAM(S): 30 TABLET ORAL at 05:00

## 2024-10-17 ENCOUNTER — APPOINTMENT (OUTPATIENT)
Dept: HUMAN REPRODUCTION | Facility: CLINIC | Age: 39
End: 2024-10-17
Payer: SELF-PAY

## 2024-10-17 PROCEDURE — 99215 OFFICE O/P EST HI 40 MIN: CPT

## 2024-10-28 ENCOUNTER — NON-APPOINTMENT (OUTPATIENT)
Age: 39
End: 2024-10-28

## 2024-10-30 NOTE — ED PROVIDER NOTE - CROS ED ROS STATEMENT
I called [facility] back and spoke to Rebecca.   Rx pended  LM for patient to call back just so he has the updated dose.    all other ROS negative except as per HPI

## 2024-11-12 ENCOUNTER — APPOINTMENT (OUTPATIENT)
Dept: OBGYN | Facility: CLINIC | Age: 39
End: 2024-11-12
Payer: MEDICAID

## 2024-11-12 VITALS
HEART RATE: 67 BPM | SYSTOLIC BLOOD PRESSURE: 131 MMHG | DIASTOLIC BLOOD PRESSURE: 84 MMHG | HEIGHT: 66.54 IN | BODY MASS INDEX: 32.08 KG/M2 | WEIGHT: 202 LBS

## 2024-11-12 DIAGNOSIS — Z11.3 ENCOUNTER FOR SCREENING FOR INFECTIONS WITH A PREDOMINANTLY SEXUAL MODE OF TRANSMISSION: ICD-10-CM

## 2024-11-12 DIAGNOSIS — Z01.419 ENCOUNTER FOR GYNECOLOGICAL EXAMINATION (GENERAL) (ROUTINE) W/OUT ABNORMAL FINDINGS: ICD-10-CM

## 2024-11-12 DIAGNOSIS — R03.0 ELEVATED BLOOD-PRESSURE READING, W/OUT DIAGNOSIS OF HYPERTENSION: ICD-10-CM

## 2024-11-12 PROCEDURE — 99459 PELVIC EXAMINATION: CPT

## 2024-11-12 PROCEDURE — 99395 PREV VISIT EST AGE 18-39: CPT | Mod: 25

## 2024-11-12 PROCEDURE — 96127 BRIEF EMOTIONAL/BEHAV ASSMT: CPT

## 2024-11-14 DIAGNOSIS — B37.9 CANDIDIASIS, UNSPECIFIED: ICD-10-CM

## 2024-11-14 LAB
BV BACTERIA RRNA VAG QL NAA+PROBE: NOT DETECTED
C GLABRATA RNA VAG QL NAA+PROBE: DETECTED
C TRACH RRNA SPEC QL NAA+PROBE: NOT DETECTED
CANDIDA RRNA VAG QL PROBE: NOT DETECTED
HBV SURFACE AG SER QL: NONREACTIVE
HCV AB SER QL: NONREACTIVE
HCV S/CO RATIO: 0.29 S/CO
HIV1+2 AB SPEC QL IA.RAPID: NONREACTIVE
HPV HIGH+LOW RISK DNA PNL CVX: NOT DETECTED
N GONORRHOEA RRNA SPEC QL NAA+PROBE: NOT DETECTED
T PALLIDUM AB SER QL IA: NEGATIVE
T VAGINALIS RRNA SPEC QL NAA+PROBE: NOT DETECTED

## 2024-11-14 RX ORDER — BACILLUS COAGULANS 1B CELL
600 CAPSULE ORAL
Qty: 21 | Refills: 0 | Status: ACTIVE | COMMUNITY
Start: 2024-11-14 | End: 1900-01-01

## 2024-11-18 LAB — CYTOLOGY CVX/VAG DOC THIN PREP: NORMAL

## 2024-11-25 ENCOUNTER — APPOINTMENT (OUTPATIENT)
Dept: HUMAN REPRODUCTION | Facility: CLINIC | Age: 39
End: 2024-11-25
Payer: SELF-PAY

## 2024-11-25 PROCEDURE — 36415 COLL VENOUS BLD VENIPUNCTURE: CPT

## 2024-11-27 ENCOUNTER — APPOINTMENT (OUTPATIENT)
Dept: HUMAN REPRODUCTION | Facility: CLINIC | Age: 39
End: 2024-11-27
Payer: SELF-PAY

## 2024-11-27 PROCEDURE — 58999I: CUSTOM

## 2024-11-27 PROCEDURE — 58340 CATHETER FOR HYSTEROGRAPHY: CPT

## 2024-11-27 PROCEDURE — 76831 ECHO EXAM UTERUS: CPT

## 2024-12-05 ENCOUNTER — APPOINTMENT (OUTPATIENT)
Dept: HUMAN REPRODUCTION | Facility: CLINIC | Age: 39
End: 2024-12-05
Payer: SELF-PAY

## 2024-12-05 PROCEDURE — 99214 OFFICE O/P EST MOD 30 MIN: CPT

## 2025-01-15 ENCOUNTER — EMERGENCY (EMERGENCY)
Facility: HOSPITAL | Age: 40
LOS: 1 days | Discharge: HOME | End: 2025-01-15
Attending: STUDENT IN AN ORGANIZED HEALTH CARE EDUCATION/TRAINING PROGRAM | Admitting: STUDENT IN AN ORGANIZED HEALTH CARE EDUCATION/TRAINING PROGRAM
Payer: MEDICAID

## 2025-01-15 VITALS
HEIGHT: 67 IN | OXYGEN SATURATION: 99 % | RESPIRATION RATE: 18 BRPM | HEART RATE: 73 BPM | DIASTOLIC BLOOD PRESSURE: 83 MMHG | SYSTOLIC BLOOD PRESSURE: 122 MMHG | WEIGHT: 195.99 LBS | TEMPERATURE: 98 F

## 2025-01-15 LAB
APPEARANCE UR: CLEAR — SIGNIFICANT CHANGE UP
BILIRUB UR-MCNC: NEGATIVE — SIGNIFICANT CHANGE UP
COLOR SPEC: YELLOW — SIGNIFICANT CHANGE UP
DIFF PNL FLD: NEGATIVE — SIGNIFICANT CHANGE UP
FLUAV AG NPH QL: SIGNIFICANT CHANGE UP
FLUBV AG NPH QL: SIGNIFICANT CHANGE UP
GLUCOSE UR QL: NEGATIVE MG/DL — SIGNIFICANT CHANGE UP
KETONES UR-MCNC: NEGATIVE MG/DL — SIGNIFICANT CHANGE UP
LEUKOCYTE ESTERASE UR-ACNC: NEGATIVE — SIGNIFICANT CHANGE UP
NITRITE UR-MCNC: NEGATIVE — SIGNIFICANT CHANGE UP
PH UR: 5.5 — SIGNIFICANT CHANGE UP (ref 5–8)
PROT UR-MCNC: SIGNIFICANT CHANGE UP MG/DL
RSV RNA NPH QL NAA+NON-PROBE: SIGNIFICANT CHANGE UP
SARS-COV-2 RNA SPEC QL NAA+PROBE: SIGNIFICANT CHANGE UP
SP GR SPEC: 1.03 — SIGNIFICANT CHANGE UP (ref 1–1.03)
UROBILINOGEN FLD QL: 0.2 MG/DL — SIGNIFICANT CHANGE UP (ref 0.2–1)

## 2025-01-15 PROCEDURE — 71046 X-RAY EXAM CHEST 2 VIEWS: CPT | Mod: 26

## 2025-01-15 PROCEDURE — 99284 EMERGENCY DEPT VISIT MOD MDM: CPT

## 2025-01-15 RX ORDER — BENZONATATE 100 MG
100 CAPSULE ORAL ONCE
Refills: 0 | Status: COMPLETED | OUTPATIENT
Start: 2025-01-15 | End: 2025-01-15

## 2025-01-15 RX ORDER — BENZONATATE 100 MG
1 CAPSULE ORAL
Qty: 15 | Refills: 0
Start: 2025-01-15 | End: 2025-01-29

## 2025-01-15 RX ORDER — FAMOTIDINE 20 MG/1
20 TABLET, FILM COATED ORAL ONCE
Refills: 0 | Status: COMPLETED | OUTPATIENT
Start: 2025-01-15 | End: 2025-01-15

## 2025-01-15 RX ORDER — GUAIFENESIN 100 MG/5ML
1 SYRUP ORAL
Qty: 42 | Refills: 0
Start: 2025-01-15 | End: 2025-01-21

## 2025-01-15 RX ORDER — MAG HYDROX/ALUMINUM HYD/SIMETH 200-200-20
30 SUSPENSION, ORAL (FINAL DOSE FORM) ORAL ONCE
Refills: 0 | Status: COMPLETED | OUTPATIENT
Start: 2025-01-15 | End: 2025-01-15

## 2025-01-15 RX ORDER — BENZONATATE 100 MG
1 CAPSULE ORAL
Qty: 15 | Refills: 0
Start: 2025-01-15 | End: 2025-01-19

## 2025-01-15 RX ORDER — IBUPROFEN 200 MG
1 TABLET ORAL
Qty: 20 | Refills: 0
Start: 2025-01-15 | End: 2025-01-19

## 2025-01-15 RX ORDER — ACETAMINOPHEN 80 MG/.8ML
2 SOLUTION/ DROPS ORAL
Qty: 56 | Refills: 0
Start: 2025-01-15 | End: 2025-01-21

## 2025-01-15 RX ORDER — ACETAMINOPHEN 80 MG/.8ML
975 SOLUTION/ DROPS ORAL ONCE
Refills: 0 | Status: COMPLETED | OUTPATIENT
Start: 2025-01-15 | End: 2025-01-15

## 2025-01-15 RX ORDER — GUAIFENESIN 100 MG/5ML
600 SYRUP ORAL ONCE
Refills: 0 | Status: COMPLETED | OUTPATIENT
Start: 2025-01-15 | End: 2025-01-15

## 2025-01-15 RX ADMIN — ACETAMINOPHEN 975 MILLIGRAM(S): 80 SOLUTION/ DROPS ORAL at 11:56

## 2025-01-15 RX ADMIN — FAMOTIDINE 20 MILLIGRAM(S): 20 TABLET, FILM COATED ORAL at 11:56

## 2025-01-15 RX ADMIN — Medication 30 MILLILITER(S): at 11:56

## 2025-01-15 RX ADMIN — Medication 600 MILLIGRAM(S): at 13:02

## 2025-01-15 RX ADMIN — Medication 100 MILLIGRAM(S): at 11:56

## 2025-01-15 NOTE — ED ADULT TRIAGE NOTE - CHIEF COMPLAINT QUOTE
Pt with productive cough x 4 days with chest congestion and tightness. pt co nausea Pt with no diarrhea. . Pt with sick contact mother  home with flu. Pt took motrin at 6am today.

## 2025-01-15 NOTE — ED ADULT NURSE NOTE - PRIMARY CARE PROVIDER
Render Post-Care Instructions In Note?: yes
Detail Level: Detailed
Post-Care Instructions: I reviewed with the patient in detail post-care instructions. Patient is to wear sunprotection, and avoid picking at any of the treated lesions. Pt may apply Vaseline to crusted or scabbing areas.
Render Note In Bullet Format When Appropriate: No
Consent: The patient's consent was obtained including but not limited to risks of crusting, scabbing, blistering, scarring, darker or lighter pigmentary change, recurrence, incomplete removal and infection.
unknown
Duration Of Freeze Thaw-Cycle (Seconds): 0
Medical Necessity Clause: This procedure was medically necessary because the lesions that were treated were:
Medical Necessity Information: It is in your best interest to select a reason for this procedure from the list below. All of these items fulfill various CMS LCD requirements except the new and changing color options.

## 2025-01-15 NOTE — ED PROVIDER NOTE - NSFOLLOWUPINSTRUCTIONS_ED_ALL_ED_FT
Viral Respiratory Infection  A viral respiratory infection is an illness that affects parts of the body that are used for breathing. These include the lungs, nose, and throat. It is caused by a germ called a virus.    Some examples of this kind of infection are:  A cold.  The flu (influenza).  A respiratory syncytial virus (RSV) infection.    What are the causes?  This condition is caused by a virus. It spreads from person to person. You can get the virus if:  You breathe in droplets from someone who is sick.  You come in contact with people who are sick.  You touch mucus or other fluid from a person who is sick.    What are the signs or symptoms?  Symptoms of this condition include:  A stuffy or runny nose.  A sore throat.  A cough.  Shortness of breath.  Trouble breathing.  Yellow or green fluid in the nose.  Other symptoms may include:  A fever.  Sweating or chills.  Tiredness (fatigue).  Achy muscles.  A headache.    How is this treated?  This condition may be treated with:  Medicines that treat viruses.  Medicines that make it easy to breathe.  Medicines that are sprayed into the nose.  Acetaminophen or NSAIDs, such as ibuprofen, to treat fever.    Follow these instructions at home:    Managing pain and congestion    Take over-the-counter and prescription medicines only as told by your doctor.  If you have a sore throat, gargle with salt water. Do this 3–4 times a day or as needed.  To make salt water, dissolve ½–1 tsp (3–6 g) of salt in 1 cup (237 mL) of warm water. Make sure that all the salt dissolves.  Use nose drops made from salt water. This helps with stuffiness (congestion). It also helps soften the skin around your nose.  Take 2 tsp (10 mL) of honey at bedtime to lessen coughing at night.  Do not give honey to children who are younger than 1 year old.  Drink enough fluid to keep your pee (urine) pale yellow.    General instructions    Rest as much as possible.  Do not drink alcohol.  Do not smoke or use any products that contain nicotine or tobacco. If you need help quitting, ask your doctor.  Keep all follow-up visits.    How is this prevented?    Get a flu shot every year. Ask your doctor when you should get your flu shot.  Do not let other people get your germs. If you are sick:  Wash your hands with soap and water often. Wash your hands after you cough or sneeze. Wash hands for at least 20 seconds. If you cannot use soap and water, use hand .  Cover your mouth when you cough. Cover your nose and mouth when you sneeze.  Do not share cups or eating utensils.  Clean commonly used objects often. Clean commonly touched surfaces.  Stay home from work or school.  Avoid contact with people who are sick during cold and flu season. This is in fall and winter.    Get help if:  Your symptoms last for 10 days or longer.  Your symptoms get worse over time.  You have very bad pain in your face or forehead.  Parts of your jaw or neck get very swollen.  You have shortness of breath.    Get help right away if:  You feel pain or pressure in your chest.  You have trouble breathing.  You faint or feel like you will faint.  You keep vomiting and it gets worse.  You feel confused.  These symptoms may be an emergency. Get help right away. Call your local emergency services (911 in the [place]  Do not wait to see if the symptoms will go away.  Do not drive yourself to the hospital.

## 2025-01-15 NOTE — ED ADULT NURSE NOTE - OBJECTIVE STATEMENT
A&Ox2. ambulatory. c/o flu like symptoms for 4 days. NAD. pt denies SOB, chest pain, dizziness, weakness, urinary symptoms, HA, n/v/d, pain. respirations are even and un labored. skin intact, medicated as ordered . safety precautions. maintained.

## 2025-01-15 NOTE — ED PROVIDER NOTE - PROGRESS NOTE DETAILS
Pt feeling improved, w/u negative, agreeable to DCTH w/PCP follow up. Results, return precautions, follow up instructions discussed and provided in DC paperwork. - Catherine Jacome MD. EM Attending

## 2025-01-15 NOTE — ED PROVIDER NOTE - CLINICAL SUMMARY MEDICAL DECISION MAKING FREE TEXT BOX
39-year-old female presenting with cough, chest tightness associated with body myalgias and congestion for a few days, reports mother is at home with pneumonia.  Patient reports subjective fevers yesterday, none today.  also endorses dysuria.  Denies nausea/vomiting,  diarrhea, numbness/tingling, focal weakness, headache, shortness of breath, chest pain, ROS otherwise negative.  No current AC use.    VS unremarkable  General: WN/WD NAD  Head: Atraumatic  Eyes: EOM grossly in tact, no scleral icterus  ENT: dry mucous membranes  Neurology: A&Ox3, nonfocal, PAYAN x 4  Respiratory: normal respiratory effort, ctab bl no wrr  CV: Extremities warm and well perfused, rrr no mrg  Abdominal: Soft, non-distended, non-tender  Extremities: No edema  Skin: No rashes    DDx incl. URI vs UTI vs less likely pna, low concern for ACS as pt w/o risk factors or familial cardiac Hx, will eval w/ekg, cxr, medications for pain/congestion per pt request, flu/covid swab; dispo likely TBDC home w/PCP follow up pending workup. - Catherine Jacome MD. EM Attending

## 2025-01-24 ENCOUNTER — EMERGENCY (EMERGENCY)
Facility: HOSPITAL | Age: 40
LOS: 0 days | Discharge: HOME | End: 2025-01-25
Attending: STUDENT IN AN ORGANIZED HEALTH CARE EDUCATION/TRAINING PROGRAM
Payer: MEDICAID

## 2025-01-24 VITALS
OXYGEN SATURATION: 97 % | TEMPERATURE: 98 F | HEART RATE: 90 BPM | HEIGHT: 67 IN | RESPIRATION RATE: 18 BRPM | WEIGHT: 195.99 LBS | DIASTOLIC BLOOD PRESSURE: 89 MMHG | SYSTOLIC BLOOD PRESSURE: 145 MMHG

## 2025-01-24 VITALS
HEART RATE: 79 BPM | OXYGEN SATURATION: 99 % | DIASTOLIC BLOOD PRESSURE: 85 MMHG | SYSTOLIC BLOOD PRESSURE: 127 MMHG | TEMPERATURE: 99 F | RESPIRATION RATE: 18 BRPM

## 2025-01-24 DIAGNOSIS — B34.9 VIRAL INFECTION, UNSPECIFIED: ICD-10-CM

## 2025-01-24 DIAGNOSIS — Z88.8 ALLERGY STATUS TO OTHER DRUGS, MEDICAMENTS AND BIOLOGICAL SUBSTANCES: ICD-10-CM

## 2025-01-24 DIAGNOSIS — Z88.2 ALLERGY STATUS TO SULFONAMIDES: ICD-10-CM

## 2025-01-24 DIAGNOSIS — R51.9 HEADACHE, UNSPECIFIED: ICD-10-CM

## 2025-01-24 DIAGNOSIS — R05.1 ACUTE COUGH: ICD-10-CM

## 2025-01-24 LAB
BASOPHILS # BLD AUTO: 0.07 K/UL — SIGNIFICANT CHANGE UP (ref 0–0.2)
BASOPHILS NFR BLD AUTO: 0.7 % — SIGNIFICANT CHANGE UP (ref 0–2)
EOSINOPHIL # BLD AUTO: 0.17 K/UL — SIGNIFICANT CHANGE UP (ref 0–0.5)
EOSINOPHIL NFR BLD AUTO: 1.8 % — SIGNIFICANT CHANGE UP (ref 0–6)
HCT VFR BLD CALC: 35.7 % — SIGNIFICANT CHANGE UP (ref 34.5–45)
HGB BLD-MCNC: 11.2 G/DL — LOW (ref 11.5–15.5)
IMM GRANULOCYTES NFR BLD AUTO: 1.7 % — HIGH (ref 0–0.9)
LYMPHOCYTES # BLD AUTO: 2.29 K/UL — SIGNIFICANT CHANGE UP (ref 1–3.3)
LYMPHOCYTES # BLD AUTO: 23.6 % — SIGNIFICANT CHANGE UP (ref 13–44)
MCHC RBC-ENTMCNC: 27.5 PG — SIGNIFICANT CHANGE UP (ref 27–34)
MCHC RBC-ENTMCNC: 31.4 G/DL — LOW (ref 32–36)
MCV RBC AUTO: 87.5 FL — SIGNIFICANT CHANGE UP (ref 80–100)
MONOCYTES # BLD AUTO: 0.61 K/UL — SIGNIFICANT CHANGE UP (ref 0–0.9)
MONOCYTES NFR BLD AUTO: 6.3 % — SIGNIFICANT CHANGE UP (ref 2–14)
NEUTROPHILS # BLD AUTO: 6.39 K/UL — SIGNIFICANT CHANGE UP (ref 1.8–7.4)
NEUTROPHILS NFR BLD AUTO: 65.9 % — SIGNIFICANT CHANGE UP (ref 43–77)
NRBC # BLD: 0 /100 WBCS — SIGNIFICANT CHANGE UP (ref 0–0)
PLATELET # BLD AUTO: 475 K/UL — HIGH (ref 150–400)
RBC # BLD: 4.08 M/UL — SIGNIFICANT CHANGE UP (ref 3.8–5.2)
RBC # FLD: 14 % — SIGNIFICANT CHANGE UP (ref 10.3–14.5)
WBC # BLD: 9.69 K/UL — SIGNIFICANT CHANGE UP (ref 3.8–10.5)
WBC # FLD AUTO: 9.69 K/UL — SIGNIFICANT CHANGE UP (ref 3.8–10.5)

## 2025-01-24 PROCEDURE — 71045 X-RAY EXAM CHEST 1 VIEW: CPT | Mod: 26

## 2025-01-24 PROCEDURE — 99285 EMERGENCY DEPT VISIT HI MDM: CPT

## 2025-01-24 RX ORDER — IPRATROPIUM BROMIDE AND ALBUTEROL SULFATE .5; 2.5 MG/3ML; MG/3ML
3 SOLUTION RESPIRATORY (INHALATION) ONCE
Refills: 0 | Status: COMPLETED | OUTPATIENT
Start: 2025-01-24 | End: 2025-01-24

## 2025-01-24 RX ORDER — SODIUM CHLORIDE 9 MG/ML
1000 INJECTION, SOLUTION INTRAMUSCULAR; INTRAVENOUS; SUBCUTANEOUS ONCE
Refills: 0 | Status: COMPLETED | OUTPATIENT
Start: 2025-01-24 | End: 2025-01-24

## 2025-01-24 RX ORDER — BENZONATATE 100 MG
100 CAPSULE ORAL ONCE
Refills: 0 | Status: COMPLETED | OUTPATIENT
Start: 2025-01-24 | End: 2025-01-24

## 2025-01-24 RX ORDER — KETOROLAC TROMETHAMINE 30 MG/ML
15 INJECTION INTRAMUSCULAR; INTRAVENOUS ONCE
Refills: 0 | Status: DISCONTINUED | OUTPATIENT
Start: 2025-01-24 | End: 2025-01-24

## 2025-01-24 RX ADMIN — Medication 100 MILLIGRAM(S): at 23:28

## 2025-01-24 RX ADMIN — IPRATROPIUM BROMIDE AND ALBUTEROL SULFATE 3 MILLILITER(S): .5; 2.5 SOLUTION RESPIRATORY (INHALATION) at 23:23

## 2025-01-24 RX ADMIN — SODIUM CHLORIDE 2000 MILLILITER(S): 9 INJECTION, SOLUTION INTRAMUSCULAR; INTRAVENOUS; SUBCUTANEOUS at 23:23

## 2025-01-25 LAB
ALBUMIN SERPL ELPH-MCNC: 3.7 G/DL — SIGNIFICANT CHANGE UP (ref 3.3–5)
ALP SERPL-CCNC: 63 U/L — SIGNIFICANT CHANGE UP (ref 40–120)
ALT FLD-CCNC: 25 U/L — SIGNIFICANT CHANGE UP (ref 12–78)
ANION GAP SERPL CALC-SCNC: 6 MMOL/L — SIGNIFICANT CHANGE UP (ref 5–17)
AST SERPL-CCNC: 16 U/L — SIGNIFICANT CHANGE UP (ref 15–37)
BILIRUB SERPL-MCNC: 0.4 MG/DL — SIGNIFICANT CHANGE UP (ref 0.2–1.2)
BUN SERPL-MCNC: 11 MG/DL — SIGNIFICANT CHANGE UP (ref 7–23)
CALCIUM SERPL-MCNC: 9.2 MG/DL — SIGNIFICANT CHANGE UP (ref 8.5–10.1)
CHLORIDE SERPL-SCNC: 107 MMOL/L — SIGNIFICANT CHANGE UP (ref 96–108)
CO2 SERPL-SCNC: 26 MMOL/L — SIGNIFICANT CHANGE UP (ref 22–31)
CREAT SERPL-MCNC: 0.62 MG/DL — SIGNIFICANT CHANGE UP (ref 0.5–1.3)
EGFR: 118 ML/MIN/1.73M2 — SIGNIFICANT CHANGE UP
FLUAV AG NPH QL: SIGNIFICANT CHANGE UP
FLUBV AG NPH QL: SIGNIFICANT CHANGE UP
GLUCOSE SERPL-MCNC: 117 MG/DL — HIGH (ref 70–99)
HCG SERPL-ACNC: <1 MIU/ML — SIGNIFICANT CHANGE UP
POTASSIUM SERPL-MCNC: 3.3 MMOL/L — LOW (ref 3.5–5.3)
POTASSIUM SERPL-SCNC: 3.3 MMOL/L — LOW (ref 3.5–5.3)
PROT SERPL-MCNC: 8.1 GM/DL — SIGNIFICANT CHANGE UP (ref 6–8.3)
RSV RNA NPH QL NAA+NON-PROBE: SIGNIFICANT CHANGE UP
SARS-COV-2 RNA SPEC QL NAA+PROBE: SIGNIFICANT CHANGE UP
SODIUM SERPL-SCNC: 139 MMOL/L — SIGNIFICANT CHANGE UP (ref 135–145)
TROPONIN I, HIGH SENSITIVITY RESULT: 4 NG/L — SIGNIFICANT CHANGE UP

## 2025-01-25 RX ORDER — ALBUTEROL SULFATE 90 UG/1
2 INHALANT RESPIRATORY (INHALATION)
Qty: 1 | Refills: 0
Start: 2025-01-25 | End: 2025-02-03

## 2025-01-25 RX ADMIN — KETOROLAC TROMETHAMINE 15 MILLIGRAM(S): 30 INJECTION INTRAMUSCULAR; INTRAVENOUS at 00:36

## 2025-01-25 NOTE — ED PROVIDER NOTE - PHYSICAL EXAMINATION
General: coughing  HEENT: NCAT, Neck supple without meningismus, PERRL, no conjunctival injection  Lungs: CTAB, No wheeze or crackles, No retractions, No increased work of breathing  Heart: S1S2 RRR, No M/R/G, Pules equal Bilaterally in upper and lower extremities distally  Abd: soft, NT/ND, No guarding, No rebound.  No hernias, no palpable masses.  Extrem: FROM in all joints, no gross deformities appreciated, no significant edema noted, No ulcers. Cap refil < 2sec.  Skin: No rash noted, warm dry.  Neuro:  Grossly normal.  No difficulty ambulating. No focal deficits.

## 2025-01-25 NOTE — ED PROVIDER NOTE - NS ED ROS FT
CONST: no fevers, no chills  EYES: no pain, no vision changes  ENT: no sore throat, no ear pain, no change in hearing  CV: no chest pain, no leg swelling  RESP: no shortness of breath, (+) cough  ABD: no abdominal pain, no nausea, no vomiting, no diarrhea  : no dysuria, no flank pain, no hematuria  MSK: no back pain, no extremity pain  NEURO: (+)  headache   HEME: no easy bleeding  SKIN:  no rash

## 2025-01-25 NOTE — ED PROVIDER NOTE - CLINICAL SUMMARY MEDICAL DECISION MAKING FREE TEXT BOX
no pmh p/w couhg x 2 weeks. seen at [facility] w negative workup, prescribed supportive care. states sxs have persisteted including headache and abd cramping.. Patient denies vision changes, eye pain, LOC, focal weakness/numbness, neck pain, fever, sore throat, chills, chest pain, palpitations, shortness of breath, wheezing, stridor, neck/back pain, other MSK pain, nausea, vomiting, constipation, blood in stool, urinary cmplaints, rash, trauma.     Sx most c/w viral syndrome- no meningeal sx, visual changes, CP, focal lung findings or isolated  sx to suspect focal bacterial source.  Plan for  supportive care, viral swab, reassess.  Anticipate d/c w/PMD f/u.     update: workup negative, pt feeling better after tessalo npearle and albuterol, will prescribe both. will have pt continue tylenol ibuprofen for headaches

## 2025-01-25 NOTE — ED ADULT NURSE NOTE - NSSEPSISSUSPECTED_ED_A_ED
aerobic capacity/endurance/posture/gross motor/gait, locomotion, and balance/cognitive impairment/muscle strength No

## 2025-01-25 NOTE — ED PROVIDER NOTE - PATIENT PORTAL LINK FT
You can access the FollowMyHealth Patient Portal offered by [facility] by registering at the following website: [email or web address]. By joining [facility]’s FollowMyHealth portal, you will also be able to view your health information using other applications (apps) compatible with our system.

## 2025-01-27 ENCOUNTER — APPOINTMENT (OUTPATIENT)
Dept: HUMAN REPRODUCTION | Facility: CLINIC | Age: 40
End: 2025-01-27
Payer: SELF-PAY

## 2025-01-27 PROCEDURE — 36415 COLL VENOUS BLD VENIPUNCTURE: CPT

## 2025-01-27 PROCEDURE — 76830 TRANSVAGINAL US NON-OB: CPT

## 2025-01-27 PROCEDURE — S4042: CPT

## 2025-01-27 PROCEDURE — 99213 OFFICE O/P EST LOW 20 MIN: CPT | Mod: 25

## 2025-02-03 ENCOUNTER — APPOINTMENT (OUTPATIENT)
Dept: HUMAN REPRODUCTION | Facility: CLINIC | Age: 40
End: 2025-02-03
Payer: SELF-PAY

## 2025-02-03 PROCEDURE — 76857 US EXAM PELVIC LIMITED: CPT

## 2025-02-03 PROCEDURE — 36415 COLL VENOUS BLD VENIPUNCTURE: CPT

## 2025-02-03 PROCEDURE — 99213 OFFICE O/P EST LOW 20 MIN: CPT | Mod: 25

## 2025-02-06 ENCOUNTER — APPOINTMENT (OUTPATIENT)
Dept: HUMAN REPRODUCTION | Facility: CLINIC | Age: 40
End: 2025-02-06
Payer: SELF-PAY

## 2025-02-06 PROCEDURE — 76857 US EXAM PELVIC LIMITED: CPT

## 2025-02-06 PROCEDURE — 99213 OFFICE O/P EST LOW 20 MIN: CPT | Mod: 25

## 2025-02-06 PROCEDURE — 36415 COLL VENOUS BLD VENIPUNCTURE: CPT

## 2025-02-08 ENCOUNTER — APPOINTMENT (OUTPATIENT)
Dept: HUMAN REPRODUCTION | Facility: CLINIC | Age: 40
End: 2025-02-08
Payer: SELF-PAY

## 2025-02-08 PROCEDURE — 99213 OFFICE O/P EST LOW 20 MIN: CPT | Mod: 25

## 2025-02-08 PROCEDURE — 76857 US EXAM PELVIC LIMITED: CPT

## 2025-02-08 PROCEDURE — 36415 COLL VENOUS BLD VENIPUNCTURE: CPT

## 2025-02-10 ENCOUNTER — APPOINTMENT (OUTPATIENT)
Dept: HUMAN REPRODUCTION | Facility: CLINIC | Age: 40
End: 2025-02-10
Payer: SELF-PAY

## 2025-02-10 PROCEDURE — 99213 OFFICE O/P EST LOW 20 MIN: CPT

## 2025-02-10 PROCEDURE — 36415 COLL VENOUS BLD VENIPUNCTURE: CPT | Mod: 25

## 2025-02-10 PROCEDURE — 76857 US EXAM PELVIC LIMITED: CPT

## 2025-02-11 ENCOUNTER — APPOINTMENT (OUTPATIENT)
Dept: HUMAN REPRODUCTION | Facility: CLINIC | Age: 40
End: 2025-02-11
Payer: SELF-PAY

## 2025-02-11 PROCEDURE — 58322 ARTIFICIAL INSEMINATION: CPT

## 2025-02-11 PROCEDURE — 89260 SPERM ISOLATION SIMPLE: CPT

## 2025-02-12 ENCOUNTER — EMERGENCY (EMERGENCY)
Facility: HOSPITAL | Age: 40
LOS: 0 days | Discharge: HOME | End: 2025-02-12
Attending: STUDENT IN AN ORGANIZED HEALTH CARE EDUCATION/TRAINING PROGRAM
Payer: MEDICAID

## 2025-02-12 VITALS
RESPIRATION RATE: 24 BRPM | DIASTOLIC BLOOD PRESSURE: 83 MMHG | OXYGEN SATURATION: 96 % | SYSTOLIC BLOOD PRESSURE: 116 MMHG | WEIGHT: 194.01 LBS | HEIGHT: 67 IN | HEART RATE: 122 BPM | TEMPERATURE: 99 F

## 2025-02-12 DIAGNOSIS — R19.7 DIARRHEA, UNSPECIFIED: ICD-10-CM

## 2025-02-12 DIAGNOSIS — J10.1 INFLUENZA DUE TO OTHER IDENTIFIED INFLUENZA VIRUS WITH OTHER RESPIRATORY MANIFESTATIONS: ICD-10-CM

## 2025-02-12 DIAGNOSIS — R11.2 NAUSEA WITH VOMITING, UNSPECIFIED: ICD-10-CM

## 2025-02-12 DIAGNOSIS — Z88.8 ALLERGY STATUS TO OTHER DRUGS, MEDICAMENTS AND BIOLOGICAL SUBSTANCES: ICD-10-CM

## 2025-02-12 DIAGNOSIS — Z88.2 ALLERGY STATUS TO SULFONAMIDES: ICD-10-CM

## 2025-02-12 LAB
ANION GAP SERPL CALC-SCNC: 3 MMOL/L — LOW (ref 5–17)
BASOPHILS # BLD AUTO: 0.02 K/UL — SIGNIFICANT CHANGE UP (ref 0–0.2)
BASOPHILS NFR BLD AUTO: 0.2 % — SIGNIFICANT CHANGE UP (ref 0–2)
BUN SERPL-MCNC: 9 MG/DL — SIGNIFICANT CHANGE UP (ref 7–23)
CALCIUM SERPL-MCNC: 9 MG/DL — SIGNIFICANT CHANGE UP (ref 8.5–10.1)
CHLORIDE SERPL-SCNC: 109 MMOL/L — HIGH (ref 96–108)
CO2 SERPL-SCNC: 27 MMOL/L — SIGNIFICANT CHANGE UP (ref 22–31)
CREAT SERPL-MCNC: 0.68 MG/DL — SIGNIFICANT CHANGE UP (ref 0.5–1.3)
EGFR: 115 ML/MIN/1.73M2 — SIGNIFICANT CHANGE UP
EOSINOPHIL # BLD AUTO: 0.09 K/UL — SIGNIFICANT CHANGE UP (ref 0–0.5)
EOSINOPHIL NFR BLD AUTO: 0.9 % — SIGNIFICANT CHANGE UP (ref 0–6)
FLUAV AG NPH QL: SIGNIFICANT CHANGE UP
FLUBV AG NPH QL: DETECTED
GLUCOSE SERPL-MCNC: 103 MG/DL — HIGH (ref 70–99)
HCG SERPL-ACNC: 3 MIU/ML — SIGNIFICANT CHANGE UP
HCT VFR BLD CALC: 40.3 % — SIGNIFICANT CHANGE UP (ref 34.5–45)
HGB BLD-MCNC: 12.3 G/DL — SIGNIFICANT CHANGE UP (ref 11.5–15.5)
IMM GRANULOCYTES NFR BLD AUTO: 0.5 % — SIGNIFICANT CHANGE UP (ref 0–0.9)
LYMPHOCYTES # BLD AUTO: 0.89 K/UL — LOW (ref 1–3.3)
LYMPHOCYTES # BLD AUTO: 9.1 % — LOW (ref 13–44)
MCHC RBC-ENTMCNC: 26.4 PG — LOW (ref 27–34)
MCHC RBC-ENTMCNC: 30.5 G/DL — LOW (ref 32–36)
MCV RBC AUTO: 86.5 FL — SIGNIFICANT CHANGE UP (ref 80–100)
MONOCYTES # BLD AUTO: 0.34 K/UL — SIGNIFICANT CHANGE UP (ref 0–0.9)
MONOCYTES NFR BLD AUTO: 3.5 % — SIGNIFICANT CHANGE UP (ref 2–14)
NEUTROPHILS # BLD AUTO: 8.35 K/UL — HIGH (ref 1.8–7.4)
NEUTROPHILS NFR BLD AUTO: 85.8 % — HIGH (ref 43–77)
NRBC BLD AUTO-RTO: 0 /100 WBCS — SIGNIFICANT CHANGE UP (ref 0–0)
PLATELET # BLD AUTO: 357 K/UL — SIGNIFICANT CHANGE UP (ref 150–400)
POTASSIUM SERPL-MCNC: 3.3 MMOL/L — LOW (ref 3.5–5.3)
POTASSIUM SERPL-SCNC: 3.3 MMOL/L — LOW (ref 3.5–5.3)
RBC # BLD: 4.66 M/UL — SIGNIFICANT CHANGE UP (ref 3.8–5.2)
RBC # FLD: 14 % — SIGNIFICANT CHANGE UP (ref 10.3–14.5)
RSV RNA NPH QL NAA+NON-PROBE: SIGNIFICANT CHANGE UP
SARS-COV-2 RNA SPEC QL NAA+PROBE: SIGNIFICANT CHANGE UP
SODIUM SERPL-SCNC: 139 MMOL/L — SIGNIFICANT CHANGE UP (ref 135–145)
TROPONIN I, HIGH SENSITIVITY RESULT: <3 NG/L — SIGNIFICANT CHANGE UP
WBC # BLD: 9.74 K/UL — SIGNIFICANT CHANGE UP (ref 3.8–10.5)
WBC # FLD AUTO: 9.74 K/UL — SIGNIFICANT CHANGE UP (ref 3.8–10.5)

## 2025-02-12 PROCEDURE — 99285 EMERGENCY DEPT VISIT HI MDM: CPT

## 2025-02-12 PROCEDURE — 93010 ELECTROCARDIOGRAM REPORT: CPT

## 2025-02-12 PROCEDURE — 71045 X-RAY EXAM CHEST 1 VIEW: CPT | Mod: 26

## 2025-02-12 RX ORDER — OSELTAMIVIR PHOSPHATE 75 MG/1
1 CAPSULE ORAL
Qty: 10 | Refills: 0
Start: 2025-02-12 | End: 2025-02-16

## 2025-02-12 RX ORDER — ACETAMINOPHEN 160 MG/5ML
650 SUSPENSION ORAL ONCE
Refills: 0 | Status: COMPLETED | OUTPATIENT
Start: 2025-02-12 | End: 2025-02-12

## 2025-02-12 RX ORDER — OSELTAMIVIR PHOSPHATE 75 MG/1
75 CAPSULE ORAL ONCE
Refills: 0 | Status: COMPLETED | OUTPATIENT
Start: 2025-02-12 | End: 2025-02-12

## 2025-02-12 RX ORDER — ONDANSETRON 4 MG/1
4 TABLET, ORALLY DISINTEGRATING ORAL ONCE
Refills: 0 | Status: COMPLETED | OUTPATIENT
Start: 2025-02-12 | End: 2025-02-12

## 2025-02-12 RX ORDER — BACTERIOSTATIC SODIUM CHLORIDE 0.9 %
1000 VIAL (ML) INJECTION ONCE
Refills: 0 | Status: COMPLETED | OUTPATIENT
Start: 2025-02-12 | End: 2025-02-12

## 2025-02-12 RX ORDER — ONDANSETRON 4 MG/1
1 TABLET, ORALLY DISINTEGRATING ORAL
Qty: 15 | Refills: 0
Start: 2025-02-12 | End: 2025-02-16

## 2025-02-12 RX ADMIN — ACETAMINOPHEN 650 MILLIGRAM(S): 160 SUSPENSION ORAL at 22:30

## 2025-02-12 RX ADMIN — OSELTAMIVIR PHOSPHATE 75 MILLIGRAM(S): 75 CAPSULE ORAL at 23:56

## 2025-02-12 RX ADMIN — ONDANSETRON 4 MILLIGRAM(S): 4 TABLET, ORALLY DISINTEGRATING ORAL at 22:57

## 2025-02-12 RX ADMIN — Medication 1000 MILLILITER(S): at 22:30

## 2025-02-12 RX ADMIN — Medication 100 MILLIGRAM(S): at 23:57

## 2025-02-12 NOTE — ED ADULT TRIAGE NOTE - CHIEF COMPLAINT QUOTE
Patient reports "Nausea, vomiting diarrhea, Pain in belly and too much coughing. Fever sometimes."  Symptoms x 1 month. Seen 2 weeks ago. Rates 10/10. + sore throat, + body aches. "Coughing dark green mucus." Pain also in neck.  LMP: 1/24/25  PMH: Gestational diabetes. Miscarriage.

## 2025-02-12 NOTE — ED PROVIDER NOTE - CLINICAL SUMMARY MEDICAL DECISION MAKING FREE TEXT BOX
This patient is a 39-year-old female presenting to the emergency department today for flulike symptoms.  CBC shows no leukocytosis.  No acute anemia.  No thrombocytopenia.  Troponin is negative.  Her EKG shows no acute ischemic changes.  BMP shows no significant electrolyte abnormalities.  No DORINA.  hCG is negative.  Minimal suspicion for ACS at this time.  Patient is influenza positive.  She was given Zofran, Tylenol, 1 L bolus of normal saline, Tessalon Perle, and Tamiflu.  She was given Tamiflu as her symptoms started today with the diarrhea as well as nausea and vomiting.    On reevaluation, patient states that she is feeling much better after having received these medications.  She is feeling closer to baseline.  At this time, we believe that the patient is safe for discharge to home with outpatient follow-up with her PCP.  She expresses understanding and is amenable to this plan.  She is well-appearing and nontoxic at this time.

## 2025-02-12 NOTE — ED PROVIDER NOTE - NS ED ROS FT
CONSTITUTIONAL: No weight loss, fever, chills, weakness or fatigue.    HEENT:    Eyes: No visual loss, blurred vision, double vision or yellow sclerae.  Ears, Nose, Throat: No hearing loss, sn.  Positive for congestion and sore throat    CARDIOVASCULAR: No chest pain, chest pressure or chest discomfort. No palpitations or edema.    RESPIRATORY: No shortness of breath, cough or sputum.    GASTROINTESTINAL: Positive for nausea, vomiting, and diarrhea.      SKIN: No rash or itching.    GENITOURINARY: Patient denies any changes to bowel or bladder function.    NEUROLOGICAL: No headache, dizziness, syncope, paralysis, ataxia, numbness or tingling in the extremities. No change in bowel or bladder control.    MUSCULOSKELETAL: No muscle, back pain, joint pain or stiffness.

## 2025-02-12 NOTE — ED PROVIDER NOTE - OBJECTIVE STATEMENT
This patient is a 39-year-old female presenting to the emergency department today for flulike symptoms.  No relevant past medical history and takes no medications at home.  She states that she started experiencing diarrhea roughly 3 days ago.  She is also complaining of some mild nausea and vomiting that started 3 days ago as well.  States that she has had a cough, sore throat, and congestion for the last 1 month.  She has been seen for the cough, sore throat, and congestion within the last 1 month and has ultimately been discharged home.  She states that she has no current fevers or chills.  No headaches, lightheadedness, or dizziness.  No chest pain or shortness of breath.  No change to her vision or hearing.  No other complaints at this time This patient is a 39-year-old female presenting to the emergency department today for flulike symptoms.  No relevant past medical history and takes no medications at home.  She states that she started experiencing diarrhea today.  She is also complaining of some mild nausea and vomiting that started today as well.  States that she has had a cough, sore throat, and congestion for the last 1 month.  She has been seen for the cough, sore throat, and congestion within the last 1 month and has ultimately been discharged home.  She states that she has no current fevers or chills.  No headaches, lightheadedness, or dizziness.  No chest pain or shortness of breath.  No change to her vision or hearing.  No other complaints at this time

## 2025-02-12 NOTE — ED ADULT NURSE REASSESSMENT NOTE - NS ED NURSE REASSESS COMMENT FT1
covering for primary RN Juan Manuel pt resting in bed NAD noted at this time, placed on cardiac monitor, VSS.  plan of care ongoing.

## 2025-02-12 NOTE — ED ADULT NURSE NOTE - NSFALLUNIVINTERV_ED_ALL_ED
Bed/Stretcher in lowest position, wheels locked, appropriate side rails in place/Call bell, personal items and telephone in reach/Instruct patient to call for assistance before getting out of bed/chair/stretcher/Non-slip footwear applied when patient is off stretcher/[place] to call system/Physically safe environment - no spills, clutter or unnecessary equipment/Purposeful proactive rounding/Room/bathroom lighting operational, light cord in reach

## 2025-02-12 NOTE — ED PROVIDER NOTE - PATIENT PORTAL LINK FT
You can access the FollowMyHealth Patient Portal offered by [facility] by registering at the following website: [email or web address]. By joining [facility]’s FollowMyHealth portal, you will also be able to view your health information using other applications (apps) compatible with our system.

## 2025-02-12 NOTE — ED ADULT TRIAGE NOTE - HEIGHT IN INCHES
Post-op Circumcision/ Revision of Circumcision:  1. Activity; Age Appropriate:     A. Easy activity for the next 3 days:    No school / Day care for three days.     B. Three weeks of relatively easy activity:  Try to have toddlers avoid strenuous activity for three weeks.   A. Don't be surprised if they are very active 4-6 days after surgery.   B. I think God watches over children especially carefully after surgery in terms of healing.     C. Stairs are fine, just go slowly, and limit the number of trips per day.     D. Walk around in the house; Every 2 hours in the daytime, to help prevent;  1. Stiffening up,   2. Blood clots in the legs, and/or,  3. Developing pneumonia.  2. Diet: Age Appropriate:     A.  Day of surgery;   1. Liquid intake is more important than solid food:  A. Apple juice,   B. Water,   C. Half-strength Apple juice, this usually works well,  D. Milk/ formula; typically not full strength,  watered down; 2/3 milk+1/3 water for the first bottle or two,   E. Gatorade or Pedialyte  2. Solid food:  A. 3 bites of this, 3 bites of that,   B. light diet;   C. soup,   D. Jell-O,   E. Crackers,   D. Age-appropriate:  1.  Peanut butter and jelly sandwich.   2. Nothing fried or heavy.   3. No Guzman-Double-Cheeseburgers or Big Macs.   E. You have my permission to tell the child: \"The doctor does not want you to eat _______.\"     B. Day after surgery: Resume usual diet.     C. Starting Day after Surgery:   1. Age Appropriate: please give extra fruit cup twice a day to help prevent constipation.   2. Avoid:  a. Bananas,  b. Rice,   c. Applesauce, and,  d. Toast for the next 6 days, as these can contribute to constipation.     D. If no bowel movement in 3 days after surgery, please give prune juice, typically 3 times a day, until a bowel movement occurrs..  3. Discomfort:     A. Starting the day of surgery,Please give:  1. Acetaminophen (Tylenol), 160 mg ( 5 ml) by mouth,     2. Then 4 hours later: Motrin  (Ibuprofen), 50 mg, Only use Ibuprofen if 6 months of age and older,  3. Then 4 hours later, acetaminophen (Tylenol)  160 mg ( 5 ml) ,   4. And then keep alternating these for about 48 hours postop.      B. In general, I don't think is necessary to wake the child up from sleep, if sleeping comfortably, to give either one of these.     C. Oxycodone:  1. Reserve the use of the oxycodone for severe pain.   2. Basically, try not to use the oxycodone.  3.  Keep the oxycodone locked up.    4. Do not tell other people that there is oxycodone in the house.  5. It is safe to add oxycodone, if necessary, on top of the acetaminophen and ibuprofen.     D. Apply small cold packs made from Frozen Florecita Liquid Dish soap in zip-lock bags:   1. Apply to the wound(s), try for at least 15 minutes every 1 hour, while awake.   2. Apply more frequently if he is okay with that.   3. Make 3 or 4 cold packs so you can alternate them.  4. Typically boys less than 4 years of age do not like a cold pack to the penis, whereas older boys do seem to tolerate this well, and benefit.   5. For some boys, a bag of frozen peas works well.  4. The bandage(s);     A. Remove the gauze bandages, typically 2 days after the operation. Then place Special Facial Tissue,  3-6 tissues fluffed up, around and on the penis, for 3 weeks.    Special Facial Tissue:   1. For example: \"Kleenex with Aloe and Vitamin E\", or \"Puffs Plus\",   2. Both of these are Facial Tissue with Aloe/Lotion infused into the tissue itself.   3. We do not recommend regular facial tissue, as it can dry out and adhere to the wound.  4. You may apply your own preference of ointment or lotion to regular facial tissue.   A. This does not seem to work as well as well as the facial tissue infused with lotion.  B. Avoid any perfume/ scent in your choice of ointment.       B. If the Gauze bandages fall off sooner, that is fine. Then place Special Facial Tissue, 3-6 tissues fluffed up, for 3 weeks.      C. If the bandages become saturated with tissue fluid or blood, remove, and replace, or remove and place Special Facial Tissue.     D. If the gauze bandages become soiled with stool, remove, and replace, or just leave them off. Then place  Special Facial Tissue, 3-6 tissues fluffed up, for 3 weeks.     E. If the gauze bandages become wet with urine, that is fine, basically leave them alone until they fall off. Then place Special Facial Tissue, 3-6 tissues fluffed up, for 3 weeks.     F. If there is more than a drop or two of bleeding, wrap the penis with gauze or tissue and hold pressure for five minutes by the clock. If there is bleeding bigger than a 50 cent piece, call us or go to the emergency room.     G. Apply Double antibiotic ointment to the base of the penis where the local anesthetic was placed, twice a day for the next 3 days. If there is a round bandage at the base of the penis, remove the bandage in 24 hours. Apply Double antibiotic ointment to the stitches and head of the penis, for 3 weeks, twice a day.     H. If the white gauze does not come off on its own:  1. In 2-3 days, please soak him in the bathtub for 4-5 minutes to loosen it.   2. Please do not use a scissor to remove part of the gauze at a time, better to soak it off.   3. If necessary, wet a washcloth with warm water and dish soap,and apply this for 10 or 15 minutes to soften the bandage.Then place Special Facial Tissue, 3-6 tissues fluffed up, for 3 weeks.       I. Keep in mind that the penis will be rather swollen, and have some black and blue areas, typically for 2-3 weeks.     J. It is common to see yellowish areas on the head of the penis for a few weeks post-op.     K. It is common for the head of the penis to be rather bluish/ purple post op, especially when cold, for a few months, until the skin of the head of the penis becomes a little thicker.   5. Please call:     A. Our office to make a follow-up appointment, to be seen in  7 about 3 to 6 weeks, [phone].     B. If questions; please call us at [phone], 24 hours per day.      C. Please call my answering service at [phone] if concerns.     D. Please call us, or go to the emergency room, if severe persistent vomiting, or fever.  6. Please go to the emergency room or call 911 if:     A. difficulty breathing, or      B. if dehydrated from persistent vomiting.   7. Shower: allowed starting 48 hours after the procedure.  8. Bath: Wait 3 days.  For example, if the surgery was done on a Monday, Tuesday is the 1st day, Wednesday is the 2nd day, Thursday is the 3rd day.    9. Swimming:     A. In a chlorinated pool: wait 2 weeks.     B. In a lake or river: wait 3 weeks.     C. In an ocean: wait 3 weeks.  10. Age-appropriate:     A. In Diapers: Apply an extra diaper 1 size bigger, over the regular diaper, for extra padding for the next 3 weeks.     B. In Underwear: Wear boxer briefs, or whitey-tighty style underwear, with 3-6 Special Facial Tissue inside for three weeks for extra padding.      C. Boys 9 years old and older: Compression shorts are a good idea over the boxer briefs for protection, for three weeks.       D. Car seat: Definitely strap the straps, the diaper will give padding.     E. Seat belt: Definitely strap the seat belt.  11. IV site care;     A. Apply a dab of Double Antibiotic ointment, or equivalent ointment, to each:  1. IV site, and,  2. Each IV attempt site, three times per day for three weeks to minimize scarring.  12. If applicable for school:       A. For the next 3 weeks, stay indoors for quiet recess, and be excused from gym class.      B. Use the \"cover story\" of \"hernia repair\".     C. 8 pound lifting weight limit for three weeks, especially backpack.     D. Consider an elevator pass for school for 3 weeks.    13. If applicable, for older boys: Return to work in 3 weeks.

## 2025-02-13 VITALS — TEMPERATURE: 98 F

## 2025-02-25 ENCOUNTER — APPOINTMENT (OUTPATIENT)
Dept: HUMAN REPRODUCTION | Facility: CLINIC | Age: 40
End: 2025-02-25
Payer: SELF-PAY

## 2025-02-25 PROCEDURE — 99213 OFFICE O/P EST LOW 20 MIN: CPT | Mod: 25

## 2025-02-25 PROCEDURE — 36415 COLL VENOUS BLD VENIPUNCTURE: CPT

## 2025-02-25 PROCEDURE — 76857 US EXAM PELVIC LIMITED: CPT

## 2025-04-28 ENCOUNTER — APPOINTMENT (OUTPATIENT)
Dept: HUMAN REPRODUCTION | Facility: CLINIC | Age: 40
End: 2025-04-28
Payer: SELF-PAY

## 2025-04-28 PROCEDURE — 99214 OFFICE O/P EST MOD 30 MIN: CPT

## 2025-04-28 PROCEDURE — 36415 COLL VENOUS BLD VENIPUNCTURE: CPT

## 2025-05-02 ENCOUNTER — NON-APPOINTMENT (OUTPATIENT)
Age: 40
End: 2025-05-02

## 2025-05-03 ENCOUNTER — APPOINTMENT (OUTPATIENT)
Dept: HUMAN REPRODUCTION | Facility: CLINIC | Age: 40
End: 2025-05-03

## 2025-05-03 PROCEDURE — 36415 COLL VENOUS BLD VENIPUNCTURE: CPT

## 2025-05-03 PROCEDURE — 99213 OFFICE O/P EST LOW 20 MIN: CPT | Mod: 25

## 2025-05-03 PROCEDURE — S4042: CPT

## 2025-05-03 PROCEDURE — 76830 TRANSVAGINAL US NON-OB: CPT

## 2025-05-05 ENCOUNTER — APPOINTMENT (OUTPATIENT)
Dept: HUMAN REPRODUCTION | Facility: CLINIC | Age: 40
End: 2025-05-05
Payer: COMMERCIAL

## 2025-05-05 PROCEDURE — 99213 OFFICE O/P EST LOW 20 MIN: CPT | Mod: 25

## 2025-05-05 PROCEDURE — 76857 US EXAM PELVIC LIMITED: CPT

## 2025-05-05 PROCEDURE — 36415 COLL VENOUS BLD VENIPUNCTURE: CPT

## 2025-05-07 ENCOUNTER — APPOINTMENT (OUTPATIENT)
Dept: HUMAN REPRODUCTION | Facility: CLINIC | Age: 40
End: 2025-05-07
Payer: COMMERCIAL

## 2025-05-07 PROCEDURE — 76857 US EXAM PELVIC LIMITED: CPT

## 2025-05-07 PROCEDURE — 99213 OFFICE O/P EST LOW 20 MIN: CPT | Mod: 25

## 2025-05-07 PROCEDURE — 36415 COLL VENOUS BLD VENIPUNCTURE: CPT

## 2025-05-09 ENCOUNTER — APPOINTMENT (OUTPATIENT)
Dept: HUMAN REPRODUCTION | Facility: CLINIC | Age: 40
End: 2025-05-09
Payer: COMMERCIAL

## 2025-05-09 PROCEDURE — 36415 COLL VENOUS BLD VENIPUNCTURE: CPT

## 2025-05-09 PROCEDURE — 99213 OFFICE O/P EST LOW 20 MIN: CPT | Mod: 25

## 2025-05-09 PROCEDURE — 76857 US EXAM PELVIC LIMITED: CPT

## 2025-05-12 ENCOUNTER — APPOINTMENT (OUTPATIENT)
Dept: HUMAN REPRODUCTION | Facility: CLINIC | Age: 40
End: 2025-05-12
Payer: COMMERCIAL

## 2025-05-12 PROCEDURE — 76857 US EXAM PELVIC LIMITED: CPT

## 2025-05-12 PROCEDURE — 36415 COLL VENOUS BLD VENIPUNCTURE: CPT

## 2025-05-12 PROCEDURE — 99213 OFFICE O/P EST LOW 20 MIN: CPT | Mod: 25

## 2025-05-13 ENCOUNTER — APPOINTMENT (OUTPATIENT)
Dept: HUMAN REPRODUCTION | Facility: CLINIC | Age: 40
End: 2025-05-13
Payer: COMMERCIAL

## 2025-05-13 PROCEDURE — 76857 US EXAM PELVIC LIMITED: CPT

## 2025-05-13 PROCEDURE — 36415 COLL VENOUS BLD VENIPUNCTURE: CPT

## 2025-05-13 PROCEDURE — 99213 OFFICE O/P EST LOW 20 MIN: CPT | Mod: 25

## 2025-05-14 ENCOUNTER — APPOINTMENT (OUTPATIENT)
Dept: HUMAN REPRODUCTION | Facility: CLINIC | Age: 40
End: 2025-05-14

## 2025-05-14 PROCEDURE — 76857 US EXAM PELVIC LIMITED: CPT

## 2025-05-14 PROCEDURE — 99213 OFFICE O/P EST LOW 20 MIN: CPT

## 2025-05-14 PROCEDURE — 36415 COLL VENOUS BLD VENIPUNCTURE: CPT

## 2025-05-15 ENCOUNTER — APPOINTMENT (OUTPATIENT)
Dept: HUMAN REPRODUCTION | Facility: CLINIC | Age: 40
End: 2025-05-15

## 2025-05-15 PROCEDURE — 36415 COLL VENOUS BLD VENIPUNCTURE: CPT

## 2025-05-16 ENCOUNTER — APPOINTMENT (OUTPATIENT)
Dept: HUMAN REPRODUCTION | Facility: CLINIC | Age: 40
End: 2025-05-16
Payer: COMMERCIAL

## 2025-05-16 PROCEDURE — 89250 CULTR OOCYTE/EMBRYO <4 DAYS: CPT

## 2025-05-16 PROCEDURE — 58970 RETRIEVAL OF OOCYTE: CPT

## 2025-05-16 PROCEDURE — 89261 SPERM ISOLATION COMPLEX: CPT

## 2025-05-16 PROCEDURE — 76948 ECHO GUIDE OVA ASPIRATION: CPT

## 2025-05-16 PROCEDURE — 89254 OOCYTE IDENTIFICATION: CPT

## 2025-05-16 PROCEDURE — 89281 ASSIST OOCYTE FERTILIZATION: CPT

## 2025-05-16 PROCEDURE — 89268 INSEMINATION OF OOCYTES: CPT

## 2025-05-17 ENCOUNTER — APPOINTMENT (OUTPATIENT)
Dept: HUMAN REPRODUCTION | Facility: CLINIC | Age: 40
End: 2025-05-17

## 2025-05-19 PROCEDURE — 89253 EMBRYO HATCHING: CPT

## 2025-05-21 PROCEDURE — 89258 CRYOPRESERVATION EMBRYO(S): CPT

## 2025-05-21 PROCEDURE — 89272 EXTENDED CULTURE OF OOCYTES: CPT

## 2025-05-21 PROCEDURE — 89342 STORAGE/YEAR EMBRYO(S): CPT

## 2025-05-21 PROCEDURE — 89290 BIOPSY OOCYTE POLAR BODY <=5: CPT

## 2025-05-22 PROCEDURE — 89291 BIOPSY OOCYTE POLAR BODY: CPT

## 2025-07-01 ENCOUNTER — APPOINTMENT (OUTPATIENT)
Dept: HUMAN REPRODUCTION | Facility: CLINIC | Age: 40
End: 2025-07-01

## 2025-07-01 PROCEDURE — 36415 COLL VENOUS BLD VENIPUNCTURE: CPT

## 2025-07-01 PROCEDURE — 99215 OFFICE O/P EST HI 40 MIN: CPT | Mod: 25

## 2025-07-01 PROCEDURE — 76857 US EXAM PELVIC LIMITED: CPT

## 2025-07-07 ENCOUNTER — APPOINTMENT (OUTPATIENT)
Dept: HUMAN REPRODUCTION | Facility: CLINIC | Age: 40
End: 2025-07-07

## 2025-07-07 PROCEDURE — 76857 US EXAM PELVIC LIMITED: CPT

## 2025-07-07 PROCEDURE — 36415 COLL VENOUS BLD VENIPUNCTURE: CPT

## 2025-07-07 PROCEDURE — 99213 OFFICE O/P EST LOW 20 MIN: CPT | Mod: 25

## 2025-07-10 ENCOUNTER — APPOINTMENT (OUTPATIENT)
Dept: HUMAN REPRODUCTION | Facility: CLINIC | Age: 40
End: 2025-07-10

## 2025-07-10 PROCEDURE — 76857 US EXAM PELVIC LIMITED: CPT

## 2025-07-10 PROCEDURE — 99213 OFFICE O/P EST LOW 20 MIN: CPT | Mod: 25

## 2025-07-10 PROCEDURE — 36415 COLL VENOUS BLD VENIPUNCTURE: CPT

## 2025-07-14 ENCOUNTER — APPOINTMENT (OUTPATIENT)
Dept: HUMAN REPRODUCTION | Facility: CLINIC | Age: 40
End: 2025-07-14
Payer: COMMERCIAL

## 2025-07-14 PROCEDURE — 36415 COLL VENOUS BLD VENIPUNCTURE: CPT

## 2025-07-14 PROCEDURE — 99213 OFFICE O/P EST LOW 20 MIN: CPT | Mod: 25

## 2025-07-14 PROCEDURE — 76857 US EXAM PELVIC LIMITED: CPT

## 2025-07-16 ENCOUNTER — APPOINTMENT (OUTPATIENT)
Dept: HUMAN REPRODUCTION | Facility: CLINIC | Age: 40
End: 2025-07-16

## 2025-07-16 PROCEDURE — 36415 COLL VENOUS BLD VENIPUNCTURE: CPT

## 2025-07-16 PROCEDURE — 76857 US EXAM PELVIC LIMITED: CPT

## 2025-07-16 PROCEDURE — 99213 OFFICE O/P EST LOW 20 MIN: CPT | Mod: 25

## 2025-07-17 ENCOUNTER — APPOINTMENT (OUTPATIENT)
Dept: HUMAN REPRODUCTION | Facility: CLINIC | Age: 40
End: 2025-07-17

## 2025-07-17 PROCEDURE — 99213 OFFICE O/P EST LOW 20 MIN: CPT | Mod: 25

## 2025-07-17 PROCEDURE — 36415 COLL VENOUS BLD VENIPUNCTURE: CPT

## 2025-07-17 PROCEDURE — 76857 US EXAM PELVIC LIMITED: CPT

## 2025-07-18 ENCOUNTER — APPOINTMENT (OUTPATIENT)
Dept: HUMAN REPRODUCTION | Facility: CLINIC | Age: 40
End: 2025-07-18

## 2025-07-18 PROCEDURE — 36415 COLL VENOUS BLD VENIPUNCTURE: CPT

## 2025-07-18 PROCEDURE — 76857 US EXAM PELVIC LIMITED: CPT

## 2025-07-18 PROCEDURE — 99213 OFFICE O/P EST LOW 20 MIN: CPT | Mod: 25

## 2025-07-19 ENCOUNTER — APPOINTMENT (OUTPATIENT)
Dept: HUMAN REPRODUCTION | Facility: CLINIC | Age: 40
End: 2025-07-19

## 2025-07-19 PROCEDURE — 36415 COLL VENOUS BLD VENIPUNCTURE: CPT

## 2025-07-21 ENCOUNTER — APPOINTMENT (OUTPATIENT)
Dept: HUMAN REPRODUCTION | Facility: CLINIC | Age: 40
End: 2025-07-21

## 2025-07-23 ENCOUNTER — APPOINTMENT (OUTPATIENT)
Dept: HUMAN REPRODUCTION | Facility: CLINIC | Age: 40
End: 2025-07-23
Payer: SELF-PAY

## 2025-07-23 PROCEDURE — 89255 PREPARE EMBRYO FOR TRANSFER: CPT

## 2025-07-23 PROCEDURE — 89398A: CUSTOM

## 2025-07-23 PROCEDURE — 76998 US GUIDE INTRAOP: CPT

## 2025-07-23 PROCEDURE — 58974 EMBRYO TRANSFER INTRAUTERINE: CPT

## 2025-07-23 PROCEDURE — 89352 THAWING CRYOPRESRVED EMBRYO: CPT

## 2025-07-24 ENCOUNTER — APPOINTMENT (OUTPATIENT)
Dept: HUMAN REPRODUCTION | Facility: CLINIC | Age: 40
End: 2025-07-24
Payer: COMMERCIAL

## 2025-08-04 ENCOUNTER — APPOINTMENT (OUTPATIENT)
Dept: HUMAN REPRODUCTION | Facility: CLINIC | Age: 40
End: 2025-08-04
Payer: SELF-PAY

## 2025-08-04 PROCEDURE — 36415 COLL VENOUS BLD VENIPUNCTURE: CPT

## 2025-08-06 ENCOUNTER — APPOINTMENT (OUTPATIENT)
Dept: HUMAN REPRODUCTION | Facility: CLINIC | Age: 40
End: 2025-08-06

## 2025-08-07 ENCOUNTER — APPOINTMENT (OUTPATIENT)
Dept: HUMAN REPRODUCTION | Facility: CLINIC | Age: 40
End: 2025-08-07
Payer: SELF-PAY

## 2025-08-07 PROCEDURE — 36415 COLL VENOUS BLD VENIPUNCTURE: CPT

## 2025-08-14 ENCOUNTER — APPOINTMENT (OUTPATIENT)
Dept: HUMAN REPRODUCTION | Facility: CLINIC | Age: 40
End: 2025-08-14
Payer: SELF-PAY

## 2025-08-14 PROCEDURE — 99213 OFFICE O/P EST LOW 20 MIN: CPT | Mod: 25

## 2025-08-14 PROCEDURE — 76817 TRANSVAGINAL US OBSTETRIC: CPT

## 2025-08-14 PROCEDURE — 36415 COLL VENOUS BLD VENIPUNCTURE: CPT

## 2025-08-22 ENCOUNTER — APPOINTMENT (OUTPATIENT)
Dept: HUMAN REPRODUCTION | Facility: CLINIC | Age: 40
End: 2025-08-22
Payer: SELF-PAY

## 2025-08-22 PROCEDURE — 36415 COLL VENOUS BLD VENIPUNCTURE: CPT

## 2025-08-22 PROCEDURE — 76817 TRANSVAGINAL US OBSTETRIC: CPT

## 2025-08-22 PROCEDURE — 99213 OFFICE O/P EST LOW 20 MIN: CPT | Mod: 25

## 2025-08-28 ENCOUNTER — APPOINTMENT (OUTPATIENT)
Dept: HUMAN REPRODUCTION | Facility: CLINIC | Age: 40
End: 2025-08-28
Payer: SELF-PAY

## 2025-08-28 PROCEDURE — 99213 OFFICE O/P EST LOW 20 MIN: CPT | Mod: 25

## 2025-08-28 PROCEDURE — 76817 TRANSVAGINAL US OBSTETRIC: CPT

## 2025-08-28 PROCEDURE — 36415 COLL VENOUS BLD VENIPUNCTURE: CPT

## 2025-08-31 ENCOUNTER — EMERGENCY (EMERGENCY)
Facility: HOSPITAL | Age: 40
LOS: 1 days | End: 2025-08-31
Attending: EMERGENCY MEDICINE | Admitting: EMERGENCY MEDICINE
Payer: MEDICAID

## 2025-08-31 VITALS
TEMPERATURE: 98 F | HEART RATE: 77 BPM | WEIGHT: 192.02 LBS | RESPIRATION RATE: 18 BRPM | OXYGEN SATURATION: 99 % | SYSTOLIC BLOOD PRESSURE: 143 MMHG | DIASTOLIC BLOOD PRESSURE: 89 MMHG

## 2025-08-31 PROCEDURE — 99284 EMERGENCY DEPT VISIT MOD MDM: CPT

## 2025-09-01 VITALS
DIASTOLIC BLOOD PRESSURE: 84 MMHG | OXYGEN SATURATION: 97 % | TEMPERATURE: 98 F | SYSTOLIC BLOOD PRESSURE: 132 MMHG | HEART RATE: 75 BPM | RESPIRATION RATE: 15 BRPM

## 2025-09-01 LAB
ALBUMIN SERPL ELPH-MCNC: 4 G/DL — SIGNIFICANT CHANGE UP (ref 3.3–5)
ALP SERPL-CCNC: 47 U/L — SIGNIFICANT CHANGE UP (ref 40–120)
ALT FLD-CCNC: 10 U/L — SIGNIFICANT CHANGE UP (ref 4–33)
ANION GAP SERPL CALC-SCNC: 13 MMOL/L — SIGNIFICANT CHANGE UP (ref 7–14)
APPEARANCE UR: ABNORMAL
AST SERPL-CCNC: 10 U/L — SIGNIFICANT CHANGE UP (ref 4–32)
BACTERIA # UR AUTO: NEGATIVE /HPF — SIGNIFICANT CHANGE UP
BASOPHILS # BLD AUTO: 0.04 K/UL — SIGNIFICANT CHANGE UP (ref 0–0.2)
BASOPHILS NFR BLD AUTO: 0.4 % — SIGNIFICANT CHANGE UP (ref 0–2)
BILIRUB SERPL-MCNC: 0.2 MG/DL — SIGNIFICANT CHANGE UP (ref 0.2–1.2)
BILIRUB UR-MCNC: NEGATIVE — SIGNIFICANT CHANGE UP
BLD GP AB SCN SERPL QL: NEGATIVE — SIGNIFICANT CHANGE UP
BUN SERPL-MCNC: 8 MG/DL — SIGNIFICANT CHANGE UP (ref 7–23)
CALCIUM SERPL-MCNC: 10.1 MG/DL — SIGNIFICANT CHANGE UP (ref 8.4–10.5)
CAST: 0 /LPF — SIGNIFICANT CHANGE UP (ref 0–4)
CHLORIDE SERPL-SCNC: 102 MMOL/L — SIGNIFICANT CHANGE UP (ref 98–107)
CO2 SERPL-SCNC: 21 MMOL/L — LOW (ref 22–31)
COLOR SPEC: ABNORMAL
CREAT SERPL-MCNC: 0.45 MG/DL — LOW (ref 0.5–1.3)
DIFF PNL FLD: ABNORMAL
EGFR: 127 ML/MIN/1.73M2 — SIGNIFICANT CHANGE UP
EGFR: 127 ML/MIN/1.73M2 — SIGNIFICANT CHANGE UP
EOSINOPHIL # BLD AUTO: 0.07 K/UL — SIGNIFICANT CHANGE UP (ref 0–0.5)
EOSINOPHIL NFR BLD AUTO: 0.7 % — SIGNIFICANT CHANGE UP (ref 0–6)
GLUCOSE SERPL-MCNC: 88 MG/DL — SIGNIFICANT CHANGE UP (ref 70–99)
GLUCOSE UR QL: NEGATIVE MG/DL — SIGNIFICANT CHANGE UP
HCG SERPL-ACNC: SIGNIFICANT CHANGE UP MIU/ML
HCT VFR BLD CALC: 37.8 % — SIGNIFICANT CHANGE UP (ref 34.5–45)
HGB BLD-MCNC: 11.6 G/DL — SIGNIFICANT CHANGE UP (ref 11.5–15.5)
IMM GRANULOCYTES # BLD AUTO: 0.09 K/UL — HIGH (ref 0–0.07)
IMM GRANULOCYTES NFR BLD AUTO: 0.9 % — SIGNIFICANT CHANGE UP (ref 0–0.9)
KETONES UR QL: NEGATIVE MG/DL — SIGNIFICANT CHANGE UP
LEUKOCYTE ESTERASE UR-ACNC: ABNORMAL
LYMPHOCYTES # BLD AUTO: 2.15 K/UL — SIGNIFICANT CHANGE UP (ref 1–3.3)
LYMPHOCYTES NFR BLD AUTO: 20.3 % — SIGNIFICANT CHANGE UP (ref 13–44)
MCHC RBC-ENTMCNC: 27.9 PG — SIGNIFICANT CHANGE UP (ref 27–34)
MCHC RBC-ENTMCNC: 30.7 G/DL — LOW (ref 32–36)
MCV RBC AUTO: 90.9 FL — SIGNIFICANT CHANGE UP (ref 80–100)
MONOCYTES # BLD AUTO: 0.68 K/UL — SIGNIFICANT CHANGE UP (ref 0–0.9)
MONOCYTES NFR BLD AUTO: 6.4 % — SIGNIFICANT CHANGE UP (ref 2–14)
NEUTROPHILS # BLD AUTO: 7.54 K/UL — HIGH (ref 1.8–7.4)
NEUTROPHILS NFR BLD AUTO: 71.3 % — SIGNIFICANT CHANGE UP (ref 43–77)
NITRITE UR-MCNC: NEGATIVE — SIGNIFICANT CHANGE UP
NRBC # BLD AUTO: 0 K/UL — SIGNIFICANT CHANGE UP (ref 0–0)
NRBC # FLD: 0 K/UL — SIGNIFICANT CHANGE UP (ref 0–0)
NRBC BLD AUTO-RTO: 0 /100 WBCS — SIGNIFICANT CHANGE UP (ref 0–0)
PH UR: 6.5 — SIGNIFICANT CHANGE UP (ref 5–8)
PLATELET # BLD AUTO: 440 K/UL — HIGH (ref 150–400)
PMV BLD: 10.7 FL — SIGNIFICANT CHANGE UP (ref 7–13)
POTASSIUM SERPL-MCNC: 3.4 MMOL/L — LOW (ref 3.5–5.3)
POTASSIUM SERPL-SCNC: 3.4 MMOL/L — LOW (ref 3.5–5.3)
PROT SERPL-MCNC: 7.5 G/DL — SIGNIFICANT CHANGE UP (ref 6–8.3)
PROT UR-MCNC: 30 MG/DL
RBC # BLD: 4.16 M/UL — SIGNIFICANT CHANGE UP (ref 3.8–5.2)
RBC # FLD: 13.8 % — SIGNIFICANT CHANGE UP (ref 10.3–14.5)
RBC CASTS # UR COMP ASSIST: 36 /HPF — HIGH (ref 0–4)
REVIEW: SIGNIFICANT CHANGE UP
RH IG SCN BLD-IMP: POSITIVE — SIGNIFICANT CHANGE UP
SODIUM SERPL-SCNC: 136 MMOL/L — SIGNIFICANT CHANGE UP (ref 135–145)
SP GR SPEC: 1.01 — SIGNIFICANT CHANGE UP (ref 1–1.03)
SQUAMOUS # UR AUTO: 8 /HPF — HIGH (ref 0–5)
UROBILINOGEN FLD QL: 1 MG/DL — SIGNIFICANT CHANGE UP (ref 0.2–1)
WBC # BLD: 10.57 K/UL — HIGH (ref 3.8–10.5)
WBC # FLD AUTO: 10.57 K/UL — HIGH (ref 3.8–10.5)
WBC UR QL: 3 /HPF — SIGNIFICANT CHANGE UP (ref 0–5)
YEAST-LIKE CELLS: PRESENT

## 2025-09-01 PROCEDURE — 76817 TRANSVAGINAL US OBSTETRIC: CPT | Mod: 26

## 2025-09-01 RX ADMIN — Medication 40 MILLIEQUIVALENT(S): at 02:39

## 2025-09-02 DIAGNOSIS — N96 RECURRENT PREGNANCY LOSS: ICD-10-CM

## 2025-09-02 RX ORDER — ENOXAPARIN SODIUM 40 MG/.4ML
40 INJECTION, SOLUTION SUBCUTANEOUS DAILY
Qty: 30 | Refills: 3 | Status: ACTIVE | COMMUNITY
Start: 2025-09-02 | End: 1900-01-01

## 2025-09-03 ENCOUNTER — APPOINTMENT (OUTPATIENT)
Dept: HEMATOLOGY ONCOLOGY | Facility: CLINIC | Age: 40
End: 2025-09-03

## 2025-09-03 LAB
CULTURE RESULTS: ABNORMAL
SPECIMEN SOURCE: SIGNIFICANT CHANGE UP

## 2025-09-05 RX ORDER — AMOXICILLIN AND CLAVULANATE POTASSIUM 500; 125 MG/1; MG/1
1 TABLET, FILM COATED ORAL
Qty: 14 | Refills: 0
Start: 2025-09-05 | End: 2025-09-11

## 2025-09-15 ENCOUNTER — NON-APPOINTMENT (OUTPATIENT)
Age: 40
End: 2025-09-15

## 2025-09-15 ENCOUNTER — ASOB RESULT (OUTPATIENT)
Age: 40
End: 2025-09-15

## 2025-09-15 ENCOUNTER — APPOINTMENT (OUTPATIENT)
Dept: OBGYN | Facility: CLINIC | Age: 40
End: 2025-09-15
Payer: MEDICAID

## 2025-09-15 VITALS
SYSTOLIC BLOOD PRESSURE: 130 MMHG | HEART RATE: 74 BPM | BODY MASS INDEX: 30.29 KG/M2 | HEIGHT: 67 IN | DIASTOLIC BLOOD PRESSURE: 84 MMHG | WEIGHT: 193 LBS

## 2025-09-15 DIAGNOSIS — N96 RECURRENT PREGNANCY LOSS: ICD-10-CM

## 2025-09-15 DIAGNOSIS — O36.80X0 PREGNANCY WITH INCONCLUSIVE FETAL VIABILITY, NOT APPLICABLE OR UNSPECIFIED: ICD-10-CM

## 2025-09-15 LAB
ALBUMIN SERPL ELPH-MCNC: 4.3 G/DL
ALP BLD-CCNC: 43 U/L
ALT SERPL-CCNC: 15 U/L
ANION GAP SERPL CALC-SCNC: 12 MMOL/L
AST SERPL-CCNC: 11 U/L
BASOPHILS # BLD AUTO: 0.02 K/UL
BASOPHILS NFR BLD AUTO: 0.3 %
BILIRUB SERPL-MCNC: 0.4 MG/DL
BUN SERPL-MCNC: 4 MG/DL
CALCIUM SERPL-MCNC: 9.9 MG/DL
CHLORIDE SERPL-SCNC: 102 MMOL/L
CO2 SERPL-SCNC: 21 MMOL/L
CREAT SERPL-MCNC: 0.41 MG/DL
EGFRCR SERPLBLD CKD-EPI 2021: 130 ML/MIN/1.73M2
EOSINOPHIL # BLD AUTO: 0.04 K/UL
EOSINOPHIL NFR BLD AUTO: 0.6 %
GLUCOSE 1H P 50 G GLC PO SERPL-MCNC: 194 MG/DL
GLUCOSE SERPL-MCNC: 178 MG/DL
HCT VFR BLD CALC: 36.8 %
HGB BLD-MCNC: 11.2 G/DL
IMM GRANULOCYTES NFR BLD AUTO: 1.3 %
LYMPHOCYTES # BLD AUTO: 1.4 K/UL
LYMPHOCYTES NFR BLD AUTO: 19.6 %
MAN DIFF?: NORMAL
MCHC RBC-ENTMCNC: 27.7 PG
MCHC RBC-ENTMCNC: 30.4 G/DL
MCV RBC AUTO: 90.9 FL
MONOCYTES # BLD AUTO: 0.27 K/UL
MONOCYTES NFR BLD AUTO: 3.8 %
NEUTROPHILS # BLD AUTO: 5.33 K/UL
NEUTROPHILS NFR BLD AUTO: 74.4 %
PLATELET # BLD AUTO: 359 K/UL
POTASSIUM SERPL-SCNC: 4.1 MMOL/L
PROT SERPL-MCNC: 7.5 G/DL
RBC # BLD: 4.05 M/UL
RBC # FLD: 13.7 %
SODIUM SERPL-SCNC: 136 MMOL/L
TSH SERPL-ACNC: 0.47 UIU/ML
WBC # FLD AUTO: 7.15 K/UL

## 2025-09-15 PROCEDURE — 76830 TRANSVAGINAL US NON-OB: CPT

## 2025-09-15 RX ORDER — ASPIRIN 81 MG/1
81 TABLET, CHEWABLE ORAL
Qty: 100 | Refills: 2 | Status: ACTIVE | COMMUNITY
Start: 2025-09-15 | End: 1900-01-01

## 2025-09-15 RX ORDER — CHROMIUM 200 MCG
25 MCG TABLET ORAL
Refills: 0 | Status: ACTIVE | COMMUNITY

## 2025-09-15 RX ORDER — PNV NO.95/FERROUS FUM/FOLIC AC 28MG-0.8MG
TABLET ORAL
Refills: 0 | Status: ACTIVE | COMMUNITY

## 2025-09-16 LAB
ABORH: NORMAL
ANTIBODY SCREEN: NORMAL
B19V IGG SER QL IA: 5.6 INDEX
B19V IGG+IGM SER-IMP: NORMAL
B19V IGG+IGM SER-IMP: POSITIVE
B19V IGM FLD-ACNC: 0.25 INDEX
B19V IGM SER-ACNC: NEGATIVE
ESTIMATED AVERAGE GLUCOSE: 94 MG/DL
HBA1C MFR BLD HPLC: 4.9 %
HBV SURFACE AG SER QL: NONREACTIVE
HCV AB SER QL: NONREACTIVE
HCV S/CO RATIO: 0.23 S/CO
HGB A MFR BLD: 97.2 %
HGB A2 MFR BLD: 2.8 %
HGB FRACT BLD-IMP: NORMAL
HIV1+2 AB SPEC QL IA.RAPID: NONREACTIVE
LEAD BLD-MCNC: 1.2 UG/DL
MEV IGG FLD QL IA: 198 AU/ML
MEV IGG+IGM SER-IMP: POSITIVE
RUBV IGG FLD-ACNC: 7.49 INDEX
RUBV IGG SER-IMP: POSITIVE
T GONDII AB SER-IMP: NEGATIVE
T GONDII AB SER-IMP: NEGATIVE
T GONDII IGG SER QL: <3 IU/ML
T GONDII IGM SER QL: <3 AU/ML
T PALLIDUM AB SER QL IA: NEGATIVE
VZV AB TITR SER: POSITIVE
VZV IGG SER IF-ACNC: 1.21 S/CO

## 2025-09-17 ENCOUNTER — NON-APPOINTMENT (OUTPATIENT)
Age: 40
End: 2025-09-17

## 2025-09-17 LAB
BACTERIA UR CULT: NORMAL
C TRACH RRNA SPEC QL NAA+PROBE: NOT DETECTED
N GONORRHOEA RRNA SPEC QL NAA+PROBE: NOT DETECTED
SOURCE AMPLIFICATION: NORMAL

## 2025-09-19 LAB
M TB IFN-G BLD-IMP: NEGATIVE
QUANTIFERON TB PLUS MITOGEN MINUS NIL: 3.54 IU/ML
QUANTIFERON TB PLUS NIL: 0.04 IU/ML
QUANTIFERON TB PLUS TB1 MINUS NIL: 0 IU/ML
QUANTIFERON TB PLUS TB2 MINUS NIL: 0 IU/ML

## 2025-09-22 PROBLEM — O24.419 GESTATIONAL DIABETES MELLITUS (GDM), ANTEPARTUM, GESTATIONAL DIABETES METHOD OF CONTROL UNSPECIFIED: Status: ACTIVE | Noted: 2025-09-22

## (undated) DEVICE — VENODYNE/SCD SLEEVE CALF MEDIUM

## (undated) DEVICE — Device

## (undated) DEVICE — TUBING AIRSEAL TRI-LUMEN FILTERED

## (undated) DEVICE — D HELP - CLEARVIEW CLEARIFY SYSTEM

## (undated) DEVICE — TROCAR SURGIQUEST AIRSEAL 12MM X 100MM

## (undated) DEVICE — XI ARM CLIP APPLIER MEDIUM-LARGE

## (undated) DEVICE — XI ARM FORCEP FENESTRATED BIPOLAR 8MM

## (undated) DEVICE — BLADE SCALPEL SAFETYLOCK #10

## (undated) DEVICE — XI SEAL UNIVERSIAL 5-12MM

## (undated) DEVICE — SOL IRR POUR NS 0.9% 500ML

## (undated) DEVICE — XI ARM GRASPER TIP UP FENESTRATED

## (undated) DEVICE — ENDOCATCH 10MM

## (undated) DEVICE — DRSG MASTISOL

## (undated) DEVICE — ELCTR BOVIE PENCIL SMOKE EVACUATION

## (undated) DEVICE — DRAPE IOBAN 23" X 23"

## (undated) DEVICE — XI ARM NEEDLE DRIVER LARGE

## (undated) DEVICE — TROCAR COVIDIEN BLUNT TIP HASSAN 12MM

## (undated) DEVICE — SUT SILK 3-0 30" SH

## (undated) DEVICE — XI ARM FORCEP MARYLAND BIPOLAR

## (undated) DEVICE — SOL IRR POUR H2O 250ML

## (undated) DEVICE — PACK PERI GYN

## (undated) DEVICE — XI ARM CLIP APPLIER LARGE

## (undated) DEVICE — XI VESSEL SEALER

## (undated) DEVICE — FOLEY TRAY 16FR 5CC LTX UMETER CLOSED

## (undated) DEVICE — SPECIMEN CONTAINER 100ML

## (undated) DEVICE — POSITIONER FOAM HEAD CRADLE (PINK)

## (undated) DEVICE — XI ARM DISSECTOR CURVED BIPOLAR 8MM

## (undated) DEVICE — XI ARM FORCEP TENACULUM

## (undated) DEVICE — NDL COUNTER FOAM AND MAGNET 40-70

## (undated) DEVICE — OSTOMY KIT 2-PIECE 4" NS (YELLOW)

## (undated) DEVICE — SYR ASEPTO

## (undated) DEVICE — SUT VICRYL 0 27" UR-6

## (undated) DEVICE — TUBING SUCTION 20FT

## (undated) DEVICE — STAPLER COVIDIEN ENDO GIA STANDARD HANDLE

## (undated) DEVICE — LUBRICATING JELLY ONESHOT 1.25OZ

## (undated) DEVICE — DRAPE MAYO STAND 30"

## (undated) DEVICE — XI ARM SCISSOR MONO CURVED

## (undated) DEVICE — SUT SILK 3-0 18" SH (POP-OFF)

## (undated) DEVICE — URETERAL CATH RED RUBBER 14FR (GREEN)

## (undated) DEVICE — SUT MONOCRYL 4-0 27" PS-2 UNDYED

## (undated) DEVICE — PACK ROBOTIC

## (undated) DEVICE — WARMING BLANKET UPPER ADULT

## (undated) DEVICE — XI ARM FORCEP PROGRASP 8MM

## (undated) DEVICE — STAPLER COVIDIEN ENDO GIA SHORT HANDLE

## (undated) DEVICE — POSITIONER PURPLE ARM ONE STEP (LARGE)

## (undated) DEVICE — XI OBTURATOR OPTICAL BLADELESS 8MM

## (undated) DEVICE — PACK BASIN SPECIAL PROCEDURE